# Patient Record
Sex: MALE | Race: WHITE | NOT HISPANIC OR LATINO | Employment: UNEMPLOYED | URBAN - NONMETROPOLITAN AREA
[De-identification: names, ages, dates, MRNs, and addresses within clinical notes are randomized per-mention and may not be internally consistent; named-entity substitution may affect disease eponyms.]

---

## 2017-08-10 ENCOUNTER — HOSPITAL ENCOUNTER (OUTPATIENT)
Facility: HOSPITAL | Age: 57
Setting detail: OBSERVATION
Discharge: ANOTHER HOSPITAL | End: 2017-08-12
Attending: INTERNAL MEDICINE | Admitting: INTERNAL MEDICINE

## 2017-08-10 ENCOUNTER — APPOINTMENT (OUTPATIENT)
Dept: GENERAL RADIOLOGY | Facility: HOSPITAL | Age: 57
End: 2017-08-10

## 2017-08-10 PROBLEM — I21.3 STEMI (ST ELEVATION MYOCARDIAL INFARCTION) (HCC): Status: ACTIVE | Noted: 2017-08-10

## 2017-08-10 LAB — ACT BLD: 114 SECONDS (ref 82–152)

## 2017-08-10 PROCEDURE — 25010000002 FENTANYL CITRATE (PF) 100 MCG/2ML SOLUTION: Performed by: INTERNAL MEDICINE

## 2017-08-10 PROCEDURE — C1769 GUIDE WIRE: HCPCS | Performed by: INTERNAL MEDICINE

## 2017-08-10 PROCEDURE — 85347 COAGULATION TIME ACTIVATED: CPT

## 2017-08-10 PROCEDURE — 25010000002 ONDANSETRON PER 1 MG: Performed by: INTERNAL MEDICINE

## 2017-08-10 PROCEDURE — 25010000002 MIDAZOLAM PER 1 MG: Performed by: INTERNAL MEDICINE

## 2017-08-10 PROCEDURE — G0379 DIRECT REFER HOSPITAL OBSERV: HCPCS

## 2017-08-10 PROCEDURE — 94799 UNLISTED PULMONARY SVC/PX: CPT

## 2017-08-10 PROCEDURE — 71010 XR CHEST 1 VW: CPT | Performed by: RADIOLOGY

## 2017-08-10 PROCEDURE — 0 IOPAMIDOL PER 1 ML: Performed by: INTERNAL MEDICINE

## 2017-08-10 PROCEDURE — 71010 HC CHEST PA OR AP: CPT

## 2017-08-10 PROCEDURE — G0378 HOSPITAL OBSERVATION PER HR: HCPCS

## 2017-08-10 PROCEDURE — C1894 INTRO/SHEATH, NON-LASER: HCPCS | Performed by: INTERNAL MEDICINE

## 2017-08-10 PROCEDURE — S0260 H&P FOR SURGERY: HCPCS | Performed by: INTERNAL MEDICINE

## 2017-08-10 PROCEDURE — 93454 CORONARY ARTERY ANGIO S&I: CPT | Performed by: INTERNAL MEDICINE

## 2017-08-10 PROCEDURE — 25010000002 DIPHENHYDRAMINE PER 50 MG: Performed by: INTERNAL MEDICINE

## 2017-08-10 PROCEDURE — 25010000002 HYDRALAZINE PER 20 MG: Performed by: INTERNAL MEDICINE

## 2017-08-10 RX ORDER — MIDAZOLAM HYDROCHLORIDE 1 MG/ML
INJECTION INTRAMUSCULAR; INTRAVENOUS AS NEEDED
Status: DISCONTINUED | OUTPATIENT
Start: 2017-08-10 | End: 2017-08-10 | Stop reason: HOSPADM

## 2017-08-10 RX ORDER — SODIUM CHLORIDE 9 MG/ML
INJECTION, SOLUTION INTRAVENOUS CONTINUOUS PRN
Status: DISCONTINUED | OUTPATIENT
Start: 2017-08-10 | End: 2017-08-10 | Stop reason: HOSPADM

## 2017-08-10 RX ORDER — ONDANSETRON 2 MG/ML
INJECTION INTRAMUSCULAR; INTRAVENOUS AS NEEDED
Status: DISCONTINUED | OUTPATIENT
Start: 2017-08-10 | End: 2017-08-10 | Stop reason: HOSPADM

## 2017-08-10 RX ORDER — ACETAMINOPHEN 325 MG/1
650 TABLET ORAL EVERY 4 HOURS PRN
Status: DISCONTINUED | OUTPATIENT
Start: 2017-08-10 | End: 2017-08-12 | Stop reason: HOSPADM

## 2017-08-10 RX ORDER — HYDRALAZINE HYDROCHLORIDE 20 MG/ML
INJECTION INTRAMUSCULAR; INTRAVENOUS AS NEEDED
Status: DISCONTINUED | OUTPATIENT
Start: 2017-08-10 | End: 2017-08-10 | Stop reason: HOSPADM

## 2017-08-10 RX ORDER — FENTANYL CITRATE 50 UG/ML
INJECTION, SOLUTION INTRAMUSCULAR; INTRAVENOUS AS NEEDED
Status: DISCONTINUED | OUTPATIENT
Start: 2017-08-10 | End: 2017-08-10 | Stop reason: HOSPADM

## 2017-08-10 RX ORDER — TRAMADOL HYDROCHLORIDE 50 MG/1
50 TABLET ORAL EVERY 8 HOURS PRN
COMMUNITY

## 2017-08-10 RX ORDER — LIDOCAINE HYDROCHLORIDE 20 MG/ML
INJECTION, SOLUTION INFILTRATION; PERINEURAL AS NEEDED
Status: DISCONTINUED | OUTPATIENT
Start: 2017-08-10 | End: 2017-08-10 | Stop reason: HOSPADM

## 2017-08-10 RX ORDER — CLOPIDOGREL BISULFATE 75 MG/1
TABLET ORAL AS NEEDED
Status: DISCONTINUED | OUTPATIENT
Start: 2017-08-10 | End: 2017-08-10 | Stop reason: HOSPADM

## 2017-08-10 RX ORDER — DIPHENHYDRAMINE HYDROCHLORIDE 50 MG/ML
INJECTION INTRAMUSCULAR; INTRAVENOUS AS NEEDED
Status: DISCONTINUED | OUTPATIENT
Start: 2017-08-10 | End: 2017-08-10 | Stop reason: HOSPADM

## 2017-08-10 RX ORDER — SODIUM CHLORIDE 0.9 % (FLUSH) 0.9 %
1-10 SYRINGE (ML) INJECTION AS NEEDED
Status: DISCONTINUED | OUTPATIENT
Start: 2017-08-10 | End: 2017-08-12 | Stop reason: HOSPADM

## 2017-08-10 RX ORDER — NICOTINE 21 MG/24HR
1 PATCH, TRANSDERMAL 24 HOURS TRANSDERMAL EVERY 24 HOURS
Status: DISCONTINUED | OUTPATIENT
Start: 2017-08-10 | End: 2017-08-12 | Stop reason: HOSPADM

## 2017-08-10 RX ORDER — SODIUM CHLORIDE 9 MG/ML
100 INJECTION, SOLUTION INTRAVENOUS CONTINUOUS
Status: DISCONTINUED | OUTPATIENT
Start: 2017-08-10 | End: 2017-08-12

## 2017-08-10 RX ADMIN — SODIUM CHLORIDE 100 ML/HR: 9 INJECTION, SOLUTION INTRAVENOUS at 17:01

## 2017-08-10 NOTE — PLAN OF CARE
Problem: Pain, Acute (Adult)  Goal: Identify Related Risk Factors and Signs and Symptoms  Outcome: Ongoing (interventions implemented as appropriate)    08/10/17 1608   Pain, Acute   Signs and Symptoms (Acute Pain) verbalization of pain descriptors;nausea/vomiting/anorexia       Goal: Acceptable Pain Control/Comfort Level  Outcome: Ongoing (interventions implemented as appropriate)    08/10/17 1608   Pain, Acute (Adult)   Acceptable Pain Control/Comfort Level making progress toward outcome

## 2017-08-11 LAB
ALBUMIN SERPL-MCNC: 3.7 G/DL (ref 3.5–5)
ALBUMIN/GLOB SERPL: 1.4 G/DL (ref 1.5–2.5)
ALP SERPL-CCNC: 81 U/L (ref 40–129)
ALT SERPL W P-5'-P-CCNC: 11 U/L (ref 10–44)
ANION GAP SERPL CALCULATED.3IONS-SCNC: 4.3 MMOL/L (ref 3.6–11.2)
AST SERPL-CCNC: 12 U/L (ref 10–34)
BILIRUB SERPL-MCNC: 0.4 MG/DL (ref 0.2–1.8)
BUN BLD-MCNC: 19 MG/DL (ref 7–21)
BUN/CREAT SERPL: 18.6 (ref 7–25)
CALCIUM SPEC-SCNC: 9.1 MG/DL (ref 7.7–10)
CHLORIDE SERPL-SCNC: 102 MMOL/L (ref 99–112)
CO2 SERPL-SCNC: 29.7 MMOL/L (ref 24.3–31.9)
CREAT BLD-MCNC: 1.02 MG/DL (ref 0.43–1.29)
DEPRECATED RDW RBC AUTO: 49.1 FL (ref 37–54)
ERYTHROCYTE [DISTWIDTH] IN BLOOD BY AUTOMATED COUNT: 15 % (ref 11.5–14.5)
GFR SERPL CREATININE-BSD FRML MDRD: 75 ML/MIN/1.73
GLOBULIN UR ELPH-MCNC: 2.6 GM/DL
GLUCOSE BLD-MCNC: 106 MG/DL (ref 70–110)
GLUCOSE BLDC GLUCOMTR-MCNC: 109 MG/DL (ref 70–130)
HCT VFR BLD AUTO: 39 % (ref 42–52)
HGB BLD-MCNC: 12.9 G/DL (ref 14–18)
MCH RBC QN AUTO: 30.9 PG (ref 27–33)
MCHC RBC AUTO-ENTMCNC: 33.1 G/DL (ref 33–37)
MCV RBC AUTO: 93.5 FL (ref 80–94)
OSMOLALITY SERPL CALC.SUM OF ELEC: 274.6 MOSM/KG (ref 273–305)
PLATELET # BLD AUTO: 371 10*3/MM3 (ref 130–400)
PMV BLD AUTO: 9.2 FL (ref 6–10)
POTASSIUM BLD-SCNC: 3.6 MMOL/L (ref 3.5–5.3)
PROT SERPL-MCNC: 6.3 G/DL (ref 6–8)
RBC # BLD AUTO: 4.17 10*6/MM3 (ref 4.7–6.1)
SODIUM BLD-SCNC: 136 MMOL/L (ref 135–153)
TROPONIN I SERPL-MCNC: 0.01 NG/ML
TROPONIN I SERPL-MCNC: 0.01 NG/ML
TROPONIN I SERPL-MCNC: 0.02 NG/ML
WBC NRBC COR # BLD: 12.95 10*3/MM3 (ref 4.5–12.5)

## 2017-08-11 PROCEDURE — 99213 OFFICE O/P EST LOW 20 MIN: CPT | Performed by: INTERNAL MEDICINE

## 2017-08-11 PROCEDURE — G0378 HOSPITAL OBSERVATION PER HR: HCPCS

## 2017-08-11 PROCEDURE — 82962 GLUCOSE BLOOD TEST: CPT

## 2017-08-11 PROCEDURE — 84484 ASSAY OF TROPONIN QUANT: CPT | Performed by: INTERNAL MEDICINE

## 2017-08-11 PROCEDURE — 94799 UNLISTED PULMONARY SVC/PX: CPT

## 2017-08-11 PROCEDURE — 93010 ELECTROCARDIOGRAM REPORT: CPT | Performed by: INTERNAL MEDICINE

## 2017-08-11 PROCEDURE — 85027 COMPLETE CBC AUTOMATED: CPT | Performed by: INTERNAL MEDICINE

## 2017-08-11 PROCEDURE — 93005 ELECTROCARDIOGRAM TRACING: CPT | Performed by: INTERNAL MEDICINE

## 2017-08-11 PROCEDURE — 80053 COMPREHEN METABOLIC PANEL: CPT | Performed by: INTERNAL MEDICINE

## 2017-08-11 RX ORDER — GABAPENTIN 600 MG/1
600 TABLET ORAL 3 TIMES DAILY PRN
COMMUNITY

## 2017-08-11 RX ORDER — TRIAMCINOLONE ACETONIDE 1 MG/G
CREAM TOPICAL DAILY PRN
Status: CANCELLED | OUTPATIENT
Start: 2017-08-11

## 2017-08-11 RX ORDER — PANTOPRAZOLE SODIUM 40 MG/1
40 TABLET, DELAYED RELEASE ORAL DAILY
COMMUNITY
End: 2017-08-12 | Stop reason: HOSPADM

## 2017-08-11 RX ORDER — GABAPENTIN 300 MG/1
600 CAPSULE ORAL 3 TIMES DAILY PRN
Status: CANCELLED | OUTPATIENT
Start: 2017-08-11

## 2017-08-11 RX ORDER — BETAMETHASONE DIPROPIONATE 0.5 MG/G
1 CREAM TOPICAL DAILY PRN
COMMUNITY

## 2017-08-11 RX ORDER — SODIUM CHLORIDE 9 MG/ML
INJECTION, SOLUTION INTRAVENOUS
Status: DISPENSED
Start: 2017-08-11 | End: 2017-08-11

## 2017-08-11 RX ORDER — TRAMADOL HYDROCHLORIDE 50 MG/1
50 TABLET ORAL EVERY 8 HOURS PRN
Status: CANCELLED | OUTPATIENT
Start: 2017-08-11

## 2017-08-11 RX ORDER — PANTOPRAZOLE SODIUM 40 MG/1
40 TABLET, DELAYED RELEASE ORAL
Status: DISCONTINUED | OUTPATIENT
Start: 2017-08-12 | End: 2017-08-12 | Stop reason: SDUPTHER

## 2017-08-11 RX ORDER — ONDANSETRON 4 MG/1
8 TABLET, FILM COATED ORAL EVERY 8 HOURS PRN
Status: DISCONTINUED | OUTPATIENT
Start: 2017-08-11 | End: 2017-08-12 | Stop reason: HOSPADM

## 2017-08-11 RX ORDER — PANTOPRAZOLE SODIUM 40 MG/1
40 TABLET, DELAYED RELEASE ORAL ONCE
Status: COMPLETED | OUTPATIENT
Start: 2017-08-12 | End: 2017-08-11

## 2017-08-11 RX ORDER — SODIUM CHLORIDE 9 MG/ML
INJECTION, SOLUTION INTRAVENOUS
Status: DISCONTINUED
Start: 2017-08-11 | End: 2017-08-11 | Stop reason: WASHOUT

## 2017-08-11 RX ORDER — NAPROXEN 250 MG/1
500 TABLET ORAL 2 TIMES DAILY WITH MEALS
Status: CANCELLED | OUTPATIENT
Start: 2017-08-11

## 2017-08-11 RX ADMIN — PANTOPRAZOLE SODIUM 40 MG: 40 TABLET, DELAYED RELEASE ORAL at 23:20

## 2017-08-11 RX ADMIN — ONDANSETRON 8 MG: 4 TABLET, FILM COATED ORAL at 23:19

## 2017-08-11 RX ADMIN — SODIUM CHLORIDE 1000 ML: 9 INJECTION, SOLUTION INTRAVENOUS at 09:57

## 2017-08-11 RX ADMIN — SODIUM CHLORIDE 1000 ML: 9 INJECTION, SOLUTION INTRAVENOUS at 23:18

## 2017-08-11 NOTE — DISCHARGE SUMMARY
Chief complaint:  Abdominal pain    History of present illness:  Mr. Castro is a pleasant 57-year-old gentleman who presented to [facility] with acute onset of abdominal discomfort.  He had an EKG obtained which was concerning for acute ST elevation in the inferolateral leads.  As such, he was transferred urgently to [facility].    Hospital course:  The patient underwent emergent cardiac catheterization which revealed normal coronary arteries.  He was observed overnight, did well and is being discharged to home.    Discharge diagnoses:  Probable viral gastroenteritis    Procedures performed:  Coronary angiography    Discharge medications:  See EMR for details    Discharge instructions:  The patient should follow-up with his primary care provider in approximately 2-4 weeks.  The patient should return to the emergency department when necessary recurrent symptoms

## 2017-08-11 NOTE — H&P
Chief complaint:  Abdominal discomfort and belly pain    History of present illness:  Mr. Castro is a pleasant 57-year-old gentleman who presents with a history of nausea and emesis.  He presented to [facility] and was noted to have ST elevation in the inferolateral leads.  Because of his presentation he was referred for emergent cardiac catheterization.  He states that he did have some mild low-grade temperatures over the last 2 days.  He felt as if he simply had a stomach virus.  He denied any angina or anginal-like symptoms.  He denies history of known coronary artery disease.    His 14 point review of systems is negative as was otherwise mentioned in history of present illness    His past medical history is notable for:  Hypertension    He is currently not taking any medicines    He has no known drug allergies.    Social history is notable for long history of tobacco consumption he does not consume excessive alcohol use illicit drugs.  Family history is unremarkable for premature coronary artery disease    Current physical examination:  Blood pressure is 113/67 with a heart rate of 76  In general he is a well-nourished well-developed gentleman in no apparent distress, alert and oriented ×3  HEENT exam reveals his oral mucosa to be normal.  He has no significant JVD or hepatojugular reflex.  He has no carotid bruits noted.  Chest is symmetric  Lungs are clear to auscultation with good air movement bilaterally.  His expiratory phase is normal  Cardiac exam reveals an intact PMI with a regular rate and rhythm.  He has a normal S1 and S2.  There is no S3 or S4.  There are no murmurs rubs or bruits.  Abdominal exam reveals a soft belly which is nontender with normal bowel sounds and no hepatosplenomegaly.  Extremities reveal no clubbing cyanosis or edema  Musculoskeletal exam is normal  Neurologic exam is normal    Current laboratory data:  Cardiac catheterization from earlier today was  unremarkable    Final impression and plan:  Overall it is my impression that Mr. Castro has presented with about an acute coronary syndrome but likely an acute GI process that is now improved.  I will continue to observe him overnight if his laboratory data is normal in the morning and I will discharge him to home.

## 2017-08-11 NOTE — NURSING NOTE
Patients heart cath was normal and Dr. Sparks's notes indicated his Discharge Dx is probable viral gastroenteritis   He does not qualify for Cardiac Rehab at this time.

## 2017-08-11 NOTE — PLAN OF CARE
Problem: Pain, Acute (Adult)  Goal: Identify Related Risk Factors and Signs and Symptoms  Outcome: Ongoing (interventions implemented as appropriate)  Goal: Acceptable Pain Control/Comfort Level  Outcome: Ongoing (interventions implemented as appropriate)    Problem: Patient Care Overview (Adult)  Goal: Plan of Care Review  Outcome: Ongoing (interventions implemented as appropriate)  Goal: Adult Individualization and Mutuality  Outcome: Ongoing (interventions implemented as appropriate)  Goal: Discharge Needs Assessment  Outcome: Ongoing (interventions implemented as appropriate)

## 2017-08-11 NOTE — PROGRESS NOTES
LOS: 0 days   Patient Care Team:  Jass Gay MD as PCP - General (Internal Medicine)      Subjective     Admission information:    Mr. Castro is a pleasant 57-year-old gentleman who presents with a history of nausea and emesis.  He presented to [facility] and was noted to have ST elevation in the inferolateral leads.  Because of his presentation he was referred for emergent cardiac catheterization.  He states that he did have some mild low-grade temperatures over the last 2 days.  He felt as if he simply had a stomach virus.  He denied any angina or anginal-like symptoms.  He denies history of known coronary artery disease.    Interval History:     The patient was discharged earlier to  by Dr. Sparks by was called to the room and she wouldn't have a shower and became dizzy.  He subsequently lost consciousness briefly.  They tried to stand up he almost lost consciousness again.  On evaluation his blood pressure was noted to be low at 80s over 50s when standing.  His discharge was therefore canceled and the patient was started on IV fluids.    History taken from: patient chart    Vital Signs  Temp:  [96.9 °F (36.1 °C)-99.1 °F (37.3 °C)] 98.3 °F (36.8 °C)  Heart Rate:  [59-96] 77  Resp:  [14-20] 18  BP: ()/(40-89) 106/62    Physical Exam:     Physical Exam   Constitutional: He is oriented to person, place, and time. He appears well-developed and well-nourished.   White male laying comfortably on bed.   HENT:   Mouth/Throat: Oropharynx is clear and moist.   Eyes: EOM are normal. Pupils are equal, round, and reactive to light.   Neck: Neck supple. No JVD present. No tracheal deviation present. No thyromegaly present.   Cardiovascular: Normal rate, regular rhythm, S1 normal and S2 normal.  Exam reveals no gallop and no friction rub.    No murmur heard.  Pulmonary/Chest: Effort normal and breath sounds normal. No respiratory distress. He has no wheezes. He has no rales.   Abdominal: Soft. Bowel sounds  are normal. He exhibits no mass. There is no tenderness.   Musculoskeletal: Normal range of motion. He exhibits no edema.   Lymphadenopathy:     He has no cervical adenopathy.   Neurological: He is alert and oriented to person, place, and time.   Skin: Skin is warm and dry. No rash noted.   Psychiatric: He has a normal mood and affect.       Results Review:     I reviewed the patient's new clinical results.  I reviewed the patient's new imaging results and agree with the interpretation.  I reviewed the patient's other test results and agree with the interpretation  I personally viewed and interpreted the patient's EKG/Telemetry data    Medication:  Scheduled Meds:  nicotine 1 patch Transdermal Q24H   sodium chloride        Continuous Infusions:  sodium chloride 100 mL/hr Last Rate: Stopped (08/10/17 2300)     PRN Meds:.•  acetaminophen  •  sodium chloride    Telemetry:Sinus rhythm in the 50s and 60s.      Assessment/Plan     1.  Abnormal EKG: Patient with abnormal EKG at [facility] which showed ST elevation in the inferolateral leads.  He underwent emergent cardiac catheterization which showed no evidence of coronary artery disease.    2.  Viral gastroenteritis: Patient with a history of a history of viral gastroenteritis with 3 days of nausea and vomiting.    3.  Syncope: Patient with what appears to be orthostatic syncope due to dehydration.    Plan:    1.  Syncope: This point we will cancel the patient is discharged and keep him overnight.  I have ordered 1 L of normal saline to rehydrate the patient.  Telemetry did not show any arrhythmias during the episode of syncope.  This point we'll continue to monitor.      Beltran Farr MD  08/11/17  10:35 AM    Dragon disclaimer:  Much of this encounter note is an electronic transcription/translation of spoken language to printed text. The electronic translation of spoken language may permit erroneous, or at times, nonsensical words or phrases  to be inadvertently transcribed; Although I have reviewed the note for such errors, some may still exist.

## 2017-08-12 ENCOUNTER — APPOINTMENT (OUTPATIENT)
Dept: CT IMAGING | Facility: HOSPITAL | Age: 57
End: 2017-08-12

## 2017-08-12 ENCOUNTER — HOSPITAL ENCOUNTER (INPATIENT)
Facility: HOSPITAL | Age: 57
LOS: 3 days | Discharge: HOME | End: 2017-08-15
Attending: INTERNAL MEDICINE | Admitting: INTERNAL MEDICINE

## 2017-08-12 VITALS
DIASTOLIC BLOOD PRESSURE: 69 MMHG | TEMPERATURE: 98.1 F | HEIGHT: 71 IN | RESPIRATION RATE: 20 BRPM | HEART RATE: 75 BPM | BODY MASS INDEX: 24.5 KG/M2 | OXYGEN SATURATION: 100 % | WEIGHT: 175 LBS | SYSTOLIC BLOOD PRESSURE: 119 MMHG

## 2017-08-12 DIAGNOSIS — D62 ACUTE BLOOD LOSS ANEMIA: Primary | ICD-10-CM

## 2017-08-12 PROBLEM — I10 HYPERTENSION: Status: ACTIVE | Noted: 2017-08-12

## 2017-08-12 PROBLEM — M19.90 ARTHRITIS: Status: ACTIVE | Noted: 2017-08-12

## 2017-08-12 PROBLEM — K92.2 GI BLEED: Status: ACTIVE | Noted: 2017-08-12

## 2017-08-12 PROBLEM — K52.9 GASTROENTERITIS: Status: ACTIVE | Noted: 2017-08-12

## 2017-08-12 LAB
ABO GROUP BLD: NORMAL
ABO GROUP BLD: NORMAL
ALBUMIN SERPL-MCNC: 3 G/DL (ref 3.5–5)
ALBUMIN SERPL-MCNC: 3.2 G/DL (ref 3.2–4.8)
ALBUMIN/GLOB SERPL: 1.4 G/DL (ref 1.5–2.5)
ALBUMIN/GLOB SERPL: 1.6 G/DL (ref 1.5–2.5)
ALP SERPL-CCNC: 55 U/L (ref 40–129)
ALP SERPL-CCNC: 61 U/L (ref 25–100)
ALT SERPL W P-5'-P-CCNC: 7 U/L (ref 10–44)
ALT SERPL W P-5'-P-CCNC: 7 U/L (ref 7–40)
ANION GAP SERPL CALCULATED.3IONS-SCNC: 2.5 MMOL/L (ref 3.6–11.2)
ANION GAP SERPL CALCULATED.3IONS-SCNC: 3 MMOL/L (ref 3–11)
APTT PPP: 24.5 SECONDS (ref 24–31)
APTT PPP: 25 SECONDS (ref 23.8–36.1)
AST SERPL-CCNC: 10 U/L (ref 10–34)
AST SERPL-CCNC: 7 U/L (ref 0–33)
BASOPHILS # BLD AUTO: 0.02 10*3/MM3 (ref 0–0.2)
BASOPHILS # BLD AUTO: 0.03 10*3/MM3 (ref 0–0.3)
BASOPHILS NFR BLD AUTO: 0.1 % (ref 0–1)
BASOPHILS NFR BLD AUTO: 0.2 % (ref 0–2)
BILIRUB SERPL-MCNC: 0.2 MG/DL (ref 0.2–1.8)
BILIRUB SERPL-MCNC: 0.3 MG/DL (ref 0.3–1.2)
BLD GP AB SCN SERPL QL: NEGATIVE
BLD GP AB SCN SERPL QL: NEGATIVE
BUN BLD-MCNC: 33 MG/DL (ref 9–23)
BUN BLD-MCNC: 51 MG/DL (ref 7–21)
BUN/CREAT SERPL: 47.1 (ref 7–25)
BUN/CREAT SERPL: 61.4 (ref 7–25)
CALCIUM SPEC-SCNC: 8 MG/DL (ref 8.7–10.4)
CALCIUM SPEC-SCNC: 8.1 MG/DL (ref 7.7–10)
CHLORIDE SERPL-SCNC: 111 MMOL/L (ref 99–109)
CHLORIDE SERPL-SCNC: 111 MMOL/L (ref 99–112)
CO2 SERPL-SCNC: 22.5 MMOL/L (ref 24.3–31.9)
CO2 SERPL-SCNC: 23 MMOL/L (ref 20–31)
CREAT BLD-MCNC: 0.7 MG/DL (ref 0.6–1.3)
CREAT BLD-MCNC: 0.83 MG/DL (ref 0.43–1.29)
D-LACTATE SERPL-SCNC: 1.5 MMOL/L (ref 0.5–2)
DEPRECATED RDW RBC AUTO: 47.4 FL (ref 37–54)
DEPRECATED RDW RBC AUTO: 50.1 FL (ref 37–54)
EOSINOPHIL # BLD AUTO: 0.18 10*3/MM3 (ref 0–0.7)
EOSINOPHIL # BLD AUTO: 0.19 10*3/MM3 (ref 0–0.3)
EOSINOPHIL NFR BLD AUTO: 1.1 % (ref 0–3)
EOSINOPHIL NFR BLD AUTO: 1.1 % (ref 0–5)
ERYTHROCYTE [DISTWIDTH] IN BLOOD BY AUTOMATED COUNT: 14.5 % (ref 11.5–14.5)
ERYTHROCYTE [DISTWIDTH] IN BLOOD BY AUTOMATED COUNT: 14.8 % (ref 11.3–14.5)
GFR SERPL CREATININE-BSD FRML MDRD: 115 ML/MIN/1.73
GFR SERPL CREATININE-BSD FRML MDRD: 95 ML/MIN/1.73
GLOBULIN UR ELPH-MCNC: 2 GM/DL
GLOBULIN UR ELPH-MCNC: 2.1 GM/DL
GLUCOSE BLD-MCNC: 132 MG/DL (ref 70–110)
GLUCOSE BLD-MCNC: 99 MG/DL (ref 70–100)
GLUCOSE BLDC GLUCOMTR-MCNC: 123 MG/DL (ref 70–130)
HBA1C MFR BLD: 5.6 % (ref 4.8–5.6)
HCT VFR BLD AUTO: 21.9 % (ref 42–52)
HCT VFR BLD AUTO: 23.1 % (ref 38.9–50.9)
HCT VFR BLD AUTO: 24.2 % (ref 42–52)
HCT VFR BLD AUTO: 26.9 % (ref 38.9–50.9)
HEMOCCULT STL QL: POSITIVE
HGB BLD-MCNC: 7.2 G/DL (ref 14–18)
HGB BLD-MCNC: 7.8 G/DL (ref 13.1–17.5)
HGB BLD-MCNC: 7.8 G/DL (ref 14–18)
HGB BLD-MCNC: 9 G/DL (ref 13.1–17.5)
IMM GRANULOCYTES # BLD: 0.1 10*3/MM3 (ref 0–0.03)
IMM GRANULOCYTES # BLD: 0.17 10*3/MM3 (ref 0–0.03)
IMM GRANULOCYTES NFR BLD: 0.6 % (ref 0–0.6)
IMM GRANULOCYTES NFR BLD: 1 % (ref 0–0.5)
INR PPP: 1
INR PPP: 1.1 (ref 0.9–1.1)
LIPASE SERPL-CCNC: 32 U/L (ref 6–51)
LYMPHOCYTES # BLD AUTO: 3.12 10*3/MM3 (ref 0.6–4.8)
LYMPHOCYTES # BLD AUTO: 3.77 10*3/MM3 (ref 1–3)
LYMPHOCYTES NFR BLD AUTO: 18.2 % (ref 24–44)
LYMPHOCYTES NFR BLD AUTO: 22.8 % (ref 21–51)
MAGNESIUM SERPL-MCNC: 1.8 MG/DL (ref 1.3–2.7)
MCH RBC QN AUTO: 30.8 PG (ref 27–33)
MCH RBC QN AUTO: 31.1 PG (ref 27–31)
MCHC RBC AUTO-ENTMCNC: 32.2 G/DL (ref 33–37)
MCHC RBC AUTO-ENTMCNC: 33.5 G/DL (ref 32–36)
MCV RBC AUTO: 93.1 FL (ref 80–99)
MCV RBC AUTO: 95.7 FL (ref 80–94)
MONOCYTES # BLD AUTO: 1.32 10*3/MM3 (ref 0.1–0.9)
MONOCYTES # BLD AUTO: 1.4 10*3/MM3 (ref 0–1)
MONOCYTES NFR BLD AUTO: 8 % (ref 0–10)
MONOCYTES NFR BLD AUTO: 8.2 % (ref 0–12)
NEUTROPHILS # BLD AUTO: 11.05 10*3/MM3 (ref 1.4–6.5)
NEUTROPHILS # BLD AUTO: 12.32 10*3/MM3 (ref 1.5–8.3)
NEUTROPHILS NFR BLD AUTO: 66.9 % (ref 30–70)
NEUTROPHILS NFR BLD AUTO: 71.8 % (ref 41–71)
OSMOLALITY SERPL CALC.SUM OF ELEC: 287.5 MOSM/KG (ref 273–305)
PHOSPHATE SERPL-MCNC: 2.1 MG/DL (ref 2.4–5.1)
PLATELET # BLD AUTO: 286 10*3/MM3 (ref 150–450)
PLATELET # BLD AUTO: 351 10*3/MM3 (ref 130–400)
PMV BLD AUTO: 8.8 FL (ref 6–12)
PMV BLD AUTO: 9.3 FL (ref 6–10)
POTASSIUM BLD-SCNC: 4 MMOL/L (ref 3.5–5.5)
POTASSIUM BLD-SCNC: 4.2 MMOL/L (ref 3.5–5.3)
PROT SERPL-MCNC: 5.1 G/DL (ref 6–8)
PROT SERPL-MCNC: 5.2 G/DL (ref 5.7–8.2)
PROTHROMBIN TIME: 10.9 SECONDS (ref 9.6–11.5)
PROTHROMBIN TIME: 14.3 SECONDS (ref 11–15.4)
RBC # BLD AUTO: 2.53 10*6/MM3 (ref 4.7–6.1)
RBC # BLD AUTO: 2.89 10*6/MM3 (ref 4.2–5.76)
RH BLD: POSITIVE
RH BLD: POSITIVE
SODIUM BLD-SCNC: 136 MMOL/L (ref 135–153)
SODIUM BLD-SCNC: 137 MMOL/L (ref 132–146)
TROPONIN I SERPL-MCNC: 0.02 NG/ML
TSH SERPL DL<=0.05 MIU/L-ACNC: 2.33 MIU/ML (ref 0.35–5.35)
WBC NRBC COR # BLD: 16.52 10*3/MM3 (ref 4.5–12.5)
WBC NRBC COR # BLD: 17.15 10*3/MM3 (ref 3.5–10.8)

## 2017-08-12 PROCEDURE — 96361 HYDRATE IV INFUSION ADD-ON: CPT

## 2017-08-12 PROCEDURE — 36430 TRANSFUSION BLD/BLD COMPNT: CPT

## 2017-08-12 PROCEDURE — 84443 ASSAY THYROID STIM HORMONE: CPT | Performed by: INTERNAL MEDICINE

## 2017-08-12 PROCEDURE — 85014 HEMATOCRIT: CPT | Performed by: INTERNAL MEDICINE

## 2017-08-12 PROCEDURE — 86920 COMPATIBILITY TEST SPIN: CPT

## 2017-08-12 PROCEDURE — 86850 RBC ANTIBODY SCREEN: CPT | Performed by: INTERNAL MEDICINE

## 2017-08-12 PROCEDURE — 86901 BLOOD TYPING SEROLOGIC RH(D): CPT | Performed by: INTERNAL MEDICINE

## 2017-08-12 PROCEDURE — 84100 ASSAY OF PHOSPHORUS: CPT | Performed by: INTERNAL MEDICINE

## 2017-08-12 PROCEDURE — 86900 BLOOD TYPING SEROLOGIC ABO: CPT

## 2017-08-12 PROCEDURE — 85025 COMPLETE CBC W/AUTO DIFF WBC: CPT | Performed by: INTERNAL MEDICINE

## 2017-08-12 PROCEDURE — 84484 ASSAY OF TROPONIN QUANT: CPT | Performed by: INTERNAL MEDICINE

## 2017-08-12 PROCEDURE — 96365 THER/PROPH/DIAG IV INF INIT: CPT

## 2017-08-12 PROCEDURE — 83036 HEMOGLOBIN GLYCOSYLATED A1C: CPT | Performed by: INTERNAL MEDICINE

## 2017-08-12 PROCEDURE — P9016 RBC LEUKOCYTES REDUCED: HCPCS

## 2017-08-12 PROCEDURE — 85018 HEMOGLOBIN: CPT | Performed by: INTERNAL MEDICINE

## 2017-08-12 PROCEDURE — 83690 ASSAY OF LIPASE: CPT | Performed by: INTERNAL MEDICINE

## 2017-08-12 PROCEDURE — G0378 HOSPITAL OBSERVATION PER HR: HCPCS

## 2017-08-12 PROCEDURE — 74178 CT ABD&PLV WO CNTR FLWD CNTR: CPT

## 2017-08-12 PROCEDURE — 80053 COMPREHEN METABOLIC PANEL: CPT | Performed by: INTERNAL MEDICINE

## 2017-08-12 PROCEDURE — 99213 OFFICE O/P EST LOW 20 MIN: CPT | Performed by: INTERNAL MEDICINE

## 2017-08-12 PROCEDURE — 82962 GLUCOSE BLOOD TEST: CPT

## 2017-08-12 PROCEDURE — 0 DIATRIZOATE MEGLUMINE & SODIUM PER 1 ML

## 2017-08-12 PROCEDURE — 85610 PROTHROMBIN TIME: CPT | Performed by: INTERNAL MEDICINE

## 2017-08-12 PROCEDURE — 83735 ASSAY OF MAGNESIUM: CPT | Performed by: INTERNAL MEDICINE

## 2017-08-12 PROCEDURE — 86900 BLOOD TYPING SEROLOGIC ABO: CPT | Performed by: INTERNAL MEDICINE

## 2017-08-12 PROCEDURE — 99291 CRITICAL CARE FIRST HOUR: CPT | Performed by: INTERNAL MEDICINE

## 2017-08-12 PROCEDURE — 99221 1ST HOSP IP/OBS SF/LOW 40: CPT | Performed by: INTERNAL MEDICINE

## 2017-08-12 PROCEDURE — 82272 OCCULT BLD FECES 1-3 TESTS: CPT | Performed by: INTERNAL MEDICINE

## 2017-08-12 PROCEDURE — 85730 THROMBOPLASTIN TIME PARTIAL: CPT | Performed by: INTERNAL MEDICINE

## 2017-08-12 PROCEDURE — 93010 ELECTROCARDIOGRAM REPORT: CPT | Performed by: INTERNAL MEDICINE

## 2017-08-12 PROCEDURE — 0 IOPAMIDOL PER 1 ML: Performed by: INTERNAL MEDICINE

## 2017-08-12 PROCEDURE — 83605 ASSAY OF LACTIC ACID: CPT | Performed by: INTERNAL MEDICINE

## 2017-08-12 RX ORDER — SODIUM CHLORIDE 0.9 % (FLUSH) 0.9 %
1-10 SYRINGE (ML) INJECTION AS NEEDED
Status: DISCONTINUED | OUTPATIENT
Start: 2017-08-12 | End: 2017-08-14

## 2017-08-12 RX ORDER — DEXTROSE AND SODIUM CHLORIDE 5; .45 G/100ML; G/100ML
75 INJECTION, SOLUTION INTRAVENOUS CONTINUOUS
Status: DISCONTINUED | OUTPATIENT
Start: 2017-08-12 | End: 2017-08-14

## 2017-08-12 RX ORDER — SODIUM CHLORIDE 9 MG/ML
250 INJECTION, SOLUTION INTRAVENOUS CONTINUOUS
Status: DISCONTINUED | OUTPATIENT
Start: 2017-08-12 | End: 2017-08-12 | Stop reason: HOSPADM

## 2017-08-12 RX ORDER — SODIUM CHLORIDE 0.9 % (FLUSH) 0.9 %
1-10 SYRINGE (ML) INJECTION AS NEEDED
Status: CANCELLED | OUTPATIENT
Start: 2017-08-12

## 2017-08-12 RX ORDER — TRIAMCINOLONE ACETONIDE 1 MG/G
CREAM TOPICAL DAILY PRN
Status: DISCONTINUED | OUTPATIENT
Start: 2017-08-12 | End: 2017-08-15 | Stop reason: HOSPADM

## 2017-08-12 RX ORDER — ONDANSETRON 2 MG/ML
4 INJECTION INTRAMUSCULAR; INTRAVENOUS EVERY 6 HOURS PRN
Status: DISCONTINUED | OUTPATIENT
Start: 2017-08-12 | End: 2017-08-15 | Stop reason: HOSPADM

## 2017-08-12 RX ORDER — SODIUM CHLORIDE 9 MG/ML
250 INJECTION, SOLUTION INTRAVENOUS CONTINUOUS
Status: CANCELLED | OUTPATIENT
Start: 2017-08-12 | End: 2017-08-14

## 2017-08-12 RX ORDER — SODIUM CHLORIDE 9 MG/ML
250 INJECTION, SOLUTION INTRAVENOUS CONTINUOUS
Start: 2017-08-12 | End: 2017-08-15 | Stop reason: HOSPADM

## 2017-08-12 RX ORDER — ONDANSETRON HYDROCHLORIDE 8 MG/1
8 TABLET, FILM COATED ORAL EVERY 8 HOURS PRN
Refills: 0
Start: 2017-08-12

## 2017-08-12 RX ORDER — ONDANSETRON 4 MG/1
8 TABLET, FILM COATED ORAL EVERY 8 HOURS PRN
Status: CANCELLED | OUTPATIENT
Start: 2017-08-12

## 2017-08-12 RX ORDER — ACETAMINOPHEN 325 MG/1
650 TABLET ORAL EVERY 4 HOURS PRN
Status: CANCELLED | OUTPATIENT
Start: 2017-08-12

## 2017-08-12 RX ORDER — GABAPENTIN 300 MG/1
600 CAPSULE ORAL EVERY 8 HOURS SCHEDULED
Status: DISCONTINUED | OUTPATIENT
Start: 2017-08-12 | End: 2017-08-15 | Stop reason: HOSPADM

## 2017-08-12 RX ORDER — NICOTINE 21 MG/24HR
1 PATCH, TRANSDERMAL 24 HOURS TRANSDERMAL EVERY 24 HOURS
Status: CANCELLED | OUTPATIENT
Start: 2017-08-12

## 2017-08-12 RX ADMIN — SODIUM CHLORIDE 250 ML/HR: 9 INJECTION, SOLUTION INTRAVENOUS at 10:44

## 2017-08-12 RX ADMIN — PANTOPRAZOLE SODIUM 40 MG: 40 TABLET, DELAYED RELEASE ORAL at 05:39

## 2017-08-12 RX ADMIN — GABAPENTIN 600 MG: 300 CAPSULE ORAL at 16:38

## 2017-08-12 RX ADMIN — SODIUM CHLORIDE 8 MG/HR: 900 INJECTION INTRAVENOUS at 15:01

## 2017-08-12 RX ADMIN — Medication: at 16:38

## 2017-08-12 RX ADMIN — SODIUM CHLORIDE 1000 ML: 9 INJECTION, SOLUTION INTRAVENOUS at 06:59

## 2017-08-12 RX ADMIN — SODIUM CHLORIDE 8 MG/HR: 900 INJECTION INTRAVENOUS at 20:34

## 2017-08-12 RX ADMIN — DEXTROSE AND SODIUM CHLORIDE 75 ML/HR: 5; 450 INJECTION, SOLUTION INTRAVENOUS at 16:38

## 2017-08-12 RX ADMIN — PANTOPRAZOLE SODIUM 8 MG/HR: 40 INJECTION, POWDER, FOR SOLUTION INTRAVENOUS at 10:46

## 2017-08-12 RX ADMIN — IOPAMIDOL 85 ML: 755 INJECTION, SOLUTION INTRAVENOUS at 18:00

## 2017-08-12 NOTE — PLAN OF CARE
Problem: Fall Risk (Adult)  Goal: Absence of Falls  Outcome: Ongoing (interventions implemented as appropriate)    08/11/17 4864   Fall Risk (Adult)   Absence of Falls making progress toward outcome

## 2017-08-12 NOTE — PLAN OF CARE
Problem: Cardiac Output, Decreased (Adult)  Goal: Adequate Cardiac Output/Effective Tissue Perfusion  Outcome: Ongoing (interventions implemented as appropriate)    08/11/17 4433   Cardiac Output, Decreased (Adult)   Adequate Cardiac Output/Effective Tissue Perfusion making progress toward outcome

## 2017-08-12 NOTE — NURSING NOTE
"cna assisted pt up to bedside commode where the cna handed the patient the callbell and educated patient to call out for assistance when done.  Pt states before he was able to use the call light he became \"dizzy\" and felt \"tingly\".  The cna was still in the A-side part of the room and was able to promptly assist the patient. The cna assisted patient back to bed while calling for the nurse.  Patient stated he was not hurt. Upon assessment of patient no s/s of distress or injury noted.    "

## 2017-08-12 NOTE — CONSULTS
[facility] Gastroenterology Consult    Referring Provider: Levi Ward MD    PCP: Jass Gay MD    Reason for Consultation: Anemia. Blood in NGT    Chief complaint: Epigastric pain, nausea and vomiting, chest pain    History of present illness:    Rex Castro is a 57 y.o. male who is transferred to [facility] for symptomatic anemia.  3 days ago, the patient states that he developed epigastric pain, chest pain, nausea, and vomiting.  He thought that he may have had a virus.  He was evaluated at an outside hospital, where he had EKG changes suggestive of myocardial infarction.  He underwent cardiac catheterization yesterday, which was unremarkable.  Overnight, he had hypotension with systolic blood pressure in the  range.  He was planning to be discharged home today, but developed syncope while going to the bathroom.  NG tube was placed, with recovery of apparent coffee grounds.  He had drop in hemoglobin from 12.9 yesterday, to 7.2 this morning.  He was transfused 1 unit of packed red blood cells prior to transfer to [place].  Repeat hemoglobin upon arrival is 9.0.  He currently denies epigastric pain, or nausea.  NG tube has minimal output of gastric contents, which does not appear bloody.  Patient has had no bowel movements since yesterday.  He denies recent melena or hematochezia.  He does occasionally take NSAIDs for headache.    Allergies:  Review of patient's allergies indicates no known allergies.    Scheduled Meds:        Infusions:    pantoprazole 8 mg/hr Last Rate: 8 mg/hr (08/12/17 1501)       PRN Meds:  sodium chloride    Home Meds:  Prescriptions Prior to Admission   Medication Sig Dispense Refill Last Dose   • betamethasone dipropionate (DIPROLENE) 0.05 % cream Apply 1 application topically Daily As Needed for Rash. Applies to elbows and legs   Past Week at Unknown time   • gabapentin (NEURONTIN) 600 MG tablet Take 600 mg by mouth 3 (Three) Times a Day As Needed (nerve pain).   Past  Week at Unknown time   • methotrexate 2.5 MG tablet Take 10 mg by mouth 1 (One) Time Per Week.   Past Week at Unknown time   • ondansetron (ZOFRAN) 8 MG tablet Take 1 tablet by mouth Every 8 (Eight) Hours As Needed for Nausea or Vomiting.  0    • pantoprazole (PROTONIX) 40 mg/100 mL (0.4 mg/mL) in 0.9% NS IVPB Infuse 8 mg/hr into a venous catheter Continuous. Indications: Gastrointestinal (GI) Bleed      • sodium chloride 0.9 % solution Infuse 250 mL/hr into a venous catheter Continuous for 2 days.      • traMADol (ULTRAM) 50 MG tablet Take 50 mg by mouth Every 8 (Eight) Hours As Needed.   8/10/2017 at 0700       ROS: Review of Systems   Constitutional: Negative for activity change, appetite change and unexpected weight change.   HENT: Negative for nosebleeds and trouble swallowing.    Eyes: Negative.    Respiratory: Negative for chest tightness, shortness of breath and wheezing.    Cardiovascular: Positive for chest pain. Negative for palpitations.   Gastrointestinal: Positive for nausea and vomiting. Negative for abdominal distention, abdominal pain, blood in stool, constipation and diarrhea.   Endocrine: Negative.    Genitourinary: Negative for dysuria and hematuria.   Musculoskeletal: Negative.    Skin: Negative for rash.   Neurological: Positive for syncope and light-headedness.   Hematological: Negative for adenopathy. Does not bruise/bleed easily.   Psychiatric/Behavioral: Negative for agitation and behavioral problems. The patient is not nervous/anxious.        PAST MED HX:  Past Medical History:   Diagnosis Date   • GERD (gastroesophageal reflux disease)    • Hemorrhoids    • Hypertension    • Inguinal hernia    • Left Inguinal hernia    • Psoriasis        PAST SURG HX:  Past Surgical History:   Procedure Laterality Date   • CARDIAC CATHETERIZATION N/A 8/10/2017    Procedure: Left Heart Cath;  Surgeon: Adonis Sparks MD;  Location: [facility] CATH INVASIVE LOCATION;  Service:        [facility] HX:  Family History  "  Problem Relation Age of Onset   • Cancer Mother    • Heart attack Mother    • Prostate cancer Father    • GI problems Brother    • Rectal cancer Other (nephew)        SOC HX:  Social History     Social History   • Marital status:      Spouse name: N/A   • Number of children: N/A   • Years of education: N/A     Occupational History   • Not on file.     Social History Main Topics   • Smoking status: Current Some Day Smoker     Types: Cigars     Start date: 8/10/2016   • Smokeless tobacco: Not on file      Comment: smokes cigar every once in awhile   • Alcohol use No   • Drug use: No      Comment: Use to smoke marijuana x 40yrs   • Sexual activity: Yes     Partners: Female     Other Topics Concern   • Not on file     Social History Narrative       PHYSICAL EXAM  /71  Pulse 80  Temp 98.8 °F (37.1 °C) (Oral)   Resp 20  Ht 71\" (180.3 cm)  Wt 186 lb 8.2 oz (84.6 kg)  SpO2 99%  BMI 26.01 kg/m2  Wt Readings from Last 3 Encounters:   08/12/17 186 lb 8.2 oz (84.6 kg)   08/12/17 175 lb (79.4 kg)   ,body mass index is 26.01 kg/(m^2).  Physical Exam   Constitutional: He is oriented to person, place, and time. He appears well-developed and well-nourished. No distress.   HENT:   Head: Normocephalic.   Mouth/Throat: No oropharyngeal exudate.   Eyes: No scleral icterus.   Neck: Normal range of motion.   Cardiovascular: Normal rate and regular rhythm.    No murmur heard.  Pulmonary/Chest: Effort normal and breath sounds normal. He has no wheezes.   Abdominal: Soft. Bowel sounds are normal. He exhibits no distension and no mass. There is no tenderness.   Musculoskeletal: Normal range of motion. He exhibits no edema.   Neurological: He is alert and oriented to person, place, and time.   Skin: Skin is warm and dry.   + Ecchymosis right lower flank   Psychiatric: He has a normal mood and affect. His behavior is normal.   Nursing note and vitals reviewed.        Results Review:   I reviewed the patient's new clinical " results.    Lab Results   Component Value Date    WBC 16.52 (H) 08/12/2017    HGB 7.2 (L) 08/12/2017    HGB 7.8 (L) 08/12/2017    HGB 12.9 (L) 08/11/2017    HCT 21.9 (C) 08/12/2017    MCV 95.7 (H) 08/12/2017     08/12/2017       Lab Results   Component Value Date    INR 1.10 08/12/2017       Lab Results   Component Value Date    GLUCOSE 132 (H) 08/12/2017    BUN 51 (H) 08/12/2017    CREATININE 0.83 08/12/2017    EGFRIFNONA 95 08/12/2017    BCR 61.4 (H) 08/12/2017    CO2 22.5 (L) 08/12/2017    CALCIUM 8.1 08/12/2017    ALBUMIN 3.00 (L) 08/12/2017    AST 10 08/12/2017    ALT 7 (L) 08/12/2017         ASSESSMENTS/PLANS    Acute blood loss anemia  Epigastric pain, resolved.  Nausea and vomiting, resolved.    Discussion: The patient has an estimated 5-unit bleed and symptomatic acute blood loss anemia with syncope.  Elevated BUN would suggest upper GI source.  He is at risk for peptic ulcer disease and Irene-Spencer tear. The NG tube aspirate at outside hospital perhaps may have contained coffee grounds, but certainly was not thaddeus blood.  It would be atypical for a 5-unit bleed in the gut to not produce melena.  The patient's ecchymosis in the right flank above the right iliac crest could represent Grey Taylor sign, or a bruise from his fall this morning.    >> Recommend CT abdomen and pelvis to rule out retroperitoneal bleed.  >> Agree with Protonix  >> EGD tomorrow, if CT negative.    I discussed the patients findings and my recommendations with patient and family    Mark I. Brunner, MD  08/12/17  3:09 PM

## 2017-08-12 NOTE — NURSING NOTE
Notified pt's daughter jason greenfield of her father becoming dizzy with low blood pressure and pt's low hgb.explained that he would be transferred to [facility]

## 2017-08-12 NOTE — PLAN OF CARE
Problem: Fall Risk (Adult)  Goal: Identify Related Risk Factors and Signs and Symptoms  Outcome: Ongoing (interventions implemented as appropriate)    08/12/17 0805   Fall Risk   Fall Risk: Related Risk Factors (pt. has orthostatic hyptension. )   Fall Risk: Signs and Symptoms presence of risk factors         08/12/17 0805   Fall Risk   Fall Risk: Related Risk Factors (pt. has orthostatic hyptension. )   Fall Risk: Signs and Symptoms presence of risk factors

## 2017-08-12 NOTE — NURSING NOTE
ACC REVIEW REPORT: [facility]        PATIENT NAME: Rex Castro    PATIENT ID: [id number]    BED: N 219    BED TYPE: ICU    BED GIVEN TO: Stefani Jim    TIME BED GIVEN: 11:00    YOB: 1960    AGE: 57    GENDER: M    PREVIOUS ADMIT TO [facility]: no    PREVIOUS ADMISSION DATE:     PATIENT CLASS:     TODAY'S DATE: 8/12/2017    TRANSFER DATE: 8-12-17    ETA: after 12:15    TRANSFERRING FACILITY: [facility] [place]    TRANSFERRING FACILITY PHONE # : [phone]    TRANSFERRING MD: Dawson    DATE/TIME REQUEST RECEIVED: 8-12-17@[address] [place] [address] RN: Lorena Carrera    REPORT FROM: Stefani Fernandez    TIME REPORT TAKEN: 11:00    DIAGNOSIS: GI bleed    REASON FOR TRANSFER TO [facility]: higher level of care    TRANSPORTATION: flying    CLINICAL REASON FOR TRANSFER TO [facility]: 57 y.o. Presented to [facility] with possible STEMI - transferred to [facility] [place] 8/11/17 - heart cath was done- no dz; pt was to be discharged but then had syncope - unknown if LOC - found to have GI bleed      CLINICAL INFORMATION    HEIGHT:     WEIGHT:     ALLERGIES: NKA    HENNESSY: no    INFECTIOUS DISEASE: no    ISOLATION: no    LAST VITAL SIGNS:  TIME: 10:48  TEMP: 97.7  PULSE: 80  B/P: 114/68  RESP: 10-16    LAB INFORMATION: glucose 132, bun 51, Cr 0.83, total protein 5.1, albumin 3, WBC 16    CULTURE INFORMATION: H&H 8/12 @0104: 12/39                                                  H&H 8/12 @0537: 7.8/24                                                  H&H 8/12 @0818: 7.2/21  MEDS/IV FLUIDS:    #18 rt. Ac - saline lock  #18 left ac - PRBC - first unit infusing at time of report  #20 left forearm - protonix gtt @ 8mcg & NS @ 250cc/hr                     CARDIAC SYSTEM:    CHEST PAIN: no    RATE: 80    RHYTHM: NSR    Is patient taking or has patient been given any drugs that could increase bleeding? no  (Plavix, Brilinta, Effient, Eliquis, Xarelto, Warfarin, Integrilin, Angiomax)    DRUG:      DOSE/FREQUENCY:     CARDIAC ENZYMES:    DATE: 8/11  TIME: 10:31  TROP:  0.007    DATE: 8/11  TIME: 1553  TROP: 0.022    Troponin 8/11 @ 2115: 0.014 & today at 0411: 0.024      HEART CATH: yes    HEART CATH DATE: 8/11    HEART CATH RESULTS: no dz    CARDIAC NOTES:  No c/o dizziness or CP - pt has been on bedrest      OXYGEN: no    O2 SAT: 100% on RA      CNS/MUSCULOSKELETAL    ALERT AND ORIENTED: yes      GI//GY      ABDOMINAL PAIN: + tenderness    VOMITING: no    DIARRHEA: no    NAUSEA: yes when NGT was placed    BOWEL SOUNDS: positive    OCCULT STOOL: ?    NG TUBE: left nare at 62cm --->LWS - <50cc brownish/red bloody secretions    DATE INSERTED: 8/12    GI//GY NOTES: pt has been NPO since last evening; he is using a urinal  Originally felt to have viral gastroenteritis    PAST MEDICAL HISTORY: inguinal hernia        Yumiko Carrera, RN  8/12/2017  11:07 AM

## 2017-08-12 NOTE — NURSING NOTE
Dr. longo called back with orders to transfer pt. To [facility].  Report called to [facility] to david saldaña rn  Pt. Transferred to [facility]

## 2017-08-12 NOTE — DISCHARGE SUMMARY
Date of Discharge:  8/12/2017    Discharge Diagnosis: Acute GI bleed    Presenting Problem/History of Present Illness  STEMI (ST elevation myocardial infarction) [I21.3]     Mr. Castro is a pleasant 57-year-old gentleman who presented to [facility] with acute onset of abdominal discomfort.  He had an EKG obtained which was concerning for acute ST elevation in the inferolateral leads.  As such, he was transferred urgently to [facility].      Hospital Course  The patient was admitted to the cardiology service also possible ST elevation myocardial infarctions.  He underwent cardiac catheterization emergently which showed no evidence of coronary artery disease.  He was to be discharged on the next day when he presented with an episode of syncope when going to the bathroom.  The patient had been complaining of a viral gastroenteritis prior to being admitted so this was believed to be related to dehydration.  He was started on volume replacement with normal saline.  Overnight his blood pressure remained low.  On repeat evaluation his hematocrit was noted to drop from 36 to 24.  A confirmatory test showed his hematocrit had dropped further to 21.  An NG tube was placed and bloody discharge was obtained from his stomach.  The patient was started on a PPI infusion and 2 large-bore IVs were placed.  He was continued on fluid resuscitation since his blood pressure remained in the 80s over 40s.  He was kept nothing by mouth.  He was transferred to the CCU.  Due to the lack of GI coverage [facility] was consulted and the patient is to be transferred for further management there.    Procedures Performed  Procedure(s):  Left Heart Cath       Consults:   Consults     No orders found from 7/12/2017 to 8/11/2017.          Condition on Discharge:  Guarded    Vital Signs  Temp:  [97.3 °F (36.3 °C)-99 °F (37.2 °C)] (P) 98.4 °F (36.9 °C)  Heart Rate:  [] 85  Resp:  [14-20] (P) 17  BP: ()/(49-79)  (P) 118/79    Physical Exam:    Constitutional: He is oriented to person, place, and time. He appears well-developed and well-nourished.   White male laying comfortably on bed.   HENT:   Mouth/Throat: Oropharynx is clear and moist.   Eyes: EOM are normal. Pupils are equal, round, and reactive to light.   Neck: Neck supple. No JVD present. No tracheal deviation present. No thyromegaly present.   Cardiovascular: Normal rate, regular rhythm, S1 normal and S2 normal.  Exam reveals no gallop and no friction rub.    No murmur heard.  Pulmonary/Chest: Effort normal and breath sounds normal. No respiratory distress. He has no wheezes. He has no rales.   Abdominal: Soft. Bowel sounds are normal. He exhibits no mass. Mild tenderness to palpation noted.  Musculoskeletal: Normal range of motion. He exhibits no edema.   Lymphadenopathy:     He has no cervical adenopathy.   Neurological: He is alert and oriented to person, place, and time.   Skin: Skin is warm and dry. No rash noted.   Psychiatric: He has a normal mood and affect.     Discharge Disposition  Short Term Hospital ([facility])    Discharge Medications   Rex Castro   Home Medication Instructions NITO:[id number]    Printed on:08/12/17 1015   Medication Information                      betamethasone dipropionate (DIPROLENE) 0.05 % cream  Apply 1 application topically Daily As Needed for Rash. Applies to elbows and legs             diclofenac sodium (VOTAREN XR) 100 MG 24 hr tablet  Take 100 mg by mouth Daily.             gabapentin (NEURONTIN) 600 MG tablet  Take 600 mg by mouth 3 (Three) Times a Day As Needed (nerve pain).             methotrexate 2.5 MG tablet  Take 10 mg by mouth 1 (One) Time Per Week.             pantoprazole (PROTONIX) 40 MG EC tablet  Take 40 mg by mouth Daily.             traMADol (ULTRAM) 50 MG tablet  Take 50 mg by mouth Every 8 (Eight) Hours As Needed.                 Discharge Diet:  Nothing by mouth    Activity at Discharge:  Bed rest    Follow-up Appointments  No future appointments.      Test Results Pending at Discharge   Order Current Status    Protime-INR Collected (08/12/17 0958)    Troponin Collected (08/12/17 0959)    aPTT Collected (08/12/17 0958)           Beltran Farr MD  08/12/17  10:17 AM    Time: Discharge 45 min

## 2017-08-12 NOTE — NURSING NOTE
Dr. longo notified of pt. Fall and that there was no injury. Reported hgb..7.8 and hct. 24.2 , blood pressure  86/56 hr in the 80's sr , wbc 16.52. Orders noted.dr. longo notified no gi coverage available. Order noted for ct abd. And pelvis.

## 2017-08-12 NOTE — PROGRESS NOTES
LOS: 0 days   Patient Care Team:  Jass Gay MD as PCP - General (Internal Medicine)      Subjective     Admission information:    Mr. Castro is a pleasant 57-year-old gentleman who presents with a history of nausea and emesis.  He presented to [facility] and was noted to have ST elevation in the inferolateral leads.  Because of his presentation he was referred for emergent cardiac catheterization.  He states that he did have some mild low-grade temperatures over the last 2 days.  He felt as if he simply had a stomach virus.  He denied any angina or anginal-like symptoms.  He denies history of known coronary artery disease.    Interval History:     The patient was monitored overnight.  His blood pressure remained low so he was given multiple boluses of normal saline.  On evaluation this morning it was noted that his hematocrit had dropped from 39 to 24.  A repeat hematocrit was noted to be 21.  His BUNs noted to be elevated at 51.  An NG tube was placed and bloody stomach contents was obtained.  This point appears the patient is having active GI bleed.  He is now on a pantoprazole infusion.    History taken from: patient chart    Vital Signs  Temp:  [97.3 °F (36.3 °C)-99 °F (37.2 °C)] 97.9 °F (36.6 °C)  Heart Rate:  [] 78  Resp:  [14-20] 14  BP: ()/(49-75) 123/65    Physical Exam:     Physical Exam   Constitutional: He is oriented to person, place, and time. He appears well-developed and well-nourished.   White male laying comfortably on bed.   HENT:   Mouth/Throat: Oropharynx is clear and moist.   Eyes: EOM are normal. Pupils are equal, round, and reactive to light.   Neck: Neck supple. No JVD present. No tracheal deviation present. No thyromegaly present.   Cardiovascular: Normal rate, regular rhythm, S1 normal and S2 normal.  Exam reveals no gallop and no friction rub.    No murmur heard.  Pulmonary/Chest: Effort normal and breath sounds normal. No respiratory distress. He has no  wheezes. He has no rales.   Abdominal: Soft. Bowel sounds are normal. He exhibits no mass. There is no tenderness.   Musculoskeletal: Normal range of motion. He exhibits no edema.   Lymphadenopathy:     He has no cervical adenopathy.   Neurological: He is alert and oriented to person, place, and time.   Skin: Skin is warm and dry. No rash noted.   Psychiatric: He has a normal mood and affect.       Results Review:     I reviewed the patient's new clinical results.  I reviewed the patient's new imaging results and agree with the interpretation.  I reviewed the patient's other test results and agree with the interpretation  I personally viewed and interpreted the patient's EKG/Telemetry data    Medication:  Scheduled Meds:    nicotine 1 patch Transdermal Q24H     Continuous Infusions:    pantoprazole 8 mg/hr     PRN Meds:.•  acetaminophen  •  ondansetron  •  sodium chloride    Telemetry:Sinus rhythm in the 50s and 60s.      Assessment/Plan     1.  GI bleed: Patient appears to be an active GI bleed.  He had been complaining of a possible viral gastroenterology prior to admission with nausea and vomiting for the last 5 days.  He denied any melena hematochezia or hematemesis.    2.  Abnormal EKG: Patient with abnormal EKG at [facility] which showed ST elevation in the inferolateral leads.  He underwent emergent cardiac catheterization which showed no evidence of coronary artery disease.  After further review it appears this was more related to repolarization abnormality than actual ST elevation.    3.  Viral gastroenteritis: Patient with a history of a history of viral gastroenteritis with 3 days of nausea and vomiting.    4.  Syncope: Patient with what appears to be orthostatic syncope due to dehydration.    Plan:    1.  GI bleed:  He is on a pantoprazole infusion and has an NG tube in place.  He has been made nothing by mouth.  We do not have any coverage for gastroenterology at this hospital during this  weekend.  Will therefore need to transfer him to [facility] for further management.    2.  Syncope: At this point appears the patient's syncope was related to anemia.  At this point will continue hydration.    Beltran Farr MD  08/12/17  9:30 AM    Dragon disclaimer:  Much of this encounter note is an electronic transcription/translation of spoken language to printed text. The electronic translation of spoken language may permit erroneous, or at times, nonsensical words or phrases to be inadvertently transcribed; Although I have reviewed the note for such errors, some may still exist.

## 2017-08-12 NOTE — PLAN OF CARE
Problem: Patient Care Overview (Adult)  Goal: Plan of Care Review    08/12/17 1802   Coping/Psychosocial Response Interventions   Plan Of Care Reviewed With patient   Patient Care Overview   Progress improving   Outcome Evaluation   Outcome Summary/Follow up Plan pt admitted from [place] today in no acute distress. His only discomfort is his NG tube. His vitals have been stable. Pt taken to Ct- awaiting report. He had one BM that was hard and formed. Positive hemocult, however, pt was noted to have a hemorhoid slightly bleeding on the stool. protonix gtt and ivf continue.

## 2017-08-13 ENCOUNTER — ANESTHESIA EVENT (OUTPATIENT)
Dept: GASTROENTEROLOGY | Facility: HOSPITAL | Age: 57
End: 2017-08-13

## 2017-08-13 ENCOUNTER — ANESTHESIA (OUTPATIENT)
Dept: GASTROENTEROLOGY | Facility: HOSPITAL | Age: 57
End: 2017-08-13

## 2017-08-13 PROBLEM — D62 ACUTE BLOOD LOSS ANEMIA: Status: ACTIVE | Noted: 2017-08-12

## 2017-08-13 LAB
ABO + RH BLD: NORMAL
ALBUMIN SERPL-MCNC: 2.9 G/DL (ref 3.2–4.8)
ALBUMIN/GLOB SERPL: 1.5 G/DL (ref 1.5–2.5)
ALP SERPL-CCNC: 56 U/L (ref 25–100)
ALT SERPL W P-5'-P-CCNC: 11 U/L (ref 7–40)
ANION GAP SERPL CALCULATED.3IONS-SCNC: 5 MMOL/L (ref 3–11)
APTT PPP: 28.9 SECONDS (ref 24–31)
AST SERPL-CCNC: 11 U/L (ref 0–33)
BASOPHILS # BLD AUTO: 0.03 10*3/MM3 (ref 0–0.2)
BASOPHILS NFR BLD AUTO: 0.2 % (ref 0–1)
BH BB BLOOD EXPIRATION DATE: NORMAL
BH BB BLOOD TYPE BARCODE: 5100
BH BB DISPENSE STATUS: NORMAL
BH BB PRODUCT CODE: NORMAL
BH BB UNIT NUMBER: NORMAL
BILIRUB SERPL-MCNC: 0.3 MG/DL (ref 0.3–1.2)
BUN BLD-MCNC: 22 MG/DL (ref 9–23)
BUN/CREAT SERPL: 27.5 (ref 7–25)
CALCIUM SPEC-SCNC: 8.1 MG/DL (ref 8.7–10.4)
CHLORIDE SERPL-SCNC: 108 MMOL/L (ref 99–109)
CO2 SERPL-SCNC: 24 MMOL/L (ref 20–31)
CREAT BLD-MCNC: 0.8 MG/DL (ref 0.6–1.3)
CROSSMATCH INTERPRETATION: NORMAL
DEPRECATED RDW RBC AUTO: 50.8 FL (ref 37–54)
EOSINOPHIL # BLD AUTO: 0.22 10*3/MM3 (ref 0–0.3)
EOSINOPHIL NFR BLD AUTO: 1.4 % (ref 0–3)
ERYTHROCYTE [DISTWIDTH] IN BLOOD BY AUTOMATED COUNT: 15.2 % (ref 11.3–14.5)
GFR SERPL CREATININE-BSD FRML MDRD: 99 ML/MIN/1.73
GLOBULIN UR ELPH-MCNC: 2 GM/DL
GLUCOSE BLD-MCNC: 108 MG/DL (ref 70–100)
HCT VFR BLD AUTO: 22 % (ref 38.9–50.9)
HCT VFR BLD AUTO: 26.3 % (ref 38.9–50.9)
HCT VFR BLD AUTO: 27.9 % (ref 38.9–50.9)
HGB BLD-MCNC: 7.5 G/DL (ref 13.1–17.5)
HGB BLD-MCNC: 9.1 G/DL (ref 13.1–17.5)
HGB BLD-MCNC: 9.6 G/DL (ref 13.1–17.5)
IMM GRANULOCYTES # BLD: 0.07 10*3/MM3 (ref 0–0.03)
IMM GRANULOCYTES NFR BLD: 0.4 % (ref 0–0.6)
INR PPP: 1.07
LYMPHOCYTES # BLD AUTO: 2.67 10*3/MM3 (ref 0.6–4.8)
LYMPHOCYTES NFR BLD AUTO: 16.7 % (ref 24–44)
MCH RBC QN AUTO: 31.6 PG (ref 27–31)
MCHC RBC AUTO-ENTMCNC: 34.1 G/DL (ref 32–36)
MCV RBC AUTO: 92.8 FL (ref 80–99)
MONOCYTES # BLD AUTO: 1.35 10*3/MM3 (ref 0–1)
MONOCYTES NFR BLD AUTO: 8.5 % (ref 0–12)
NEUTROPHILS # BLD AUTO: 11.61 10*3/MM3 (ref 1.5–8.3)
NEUTROPHILS NFR BLD AUTO: 72.8 % (ref 41–71)
PLATELET # BLD AUTO: 251 10*3/MM3 (ref 150–450)
PMV BLD AUTO: 8.8 FL (ref 6–12)
POTASSIUM BLD-SCNC: 4.1 MMOL/L (ref 3.5–5.5)
PROT SERPL-MCNC: 4.9 G/DL (ref 5.7–8.2)
PROTHROMBIN TIME: 11.7 SECONDS (ref 9.6–11.5)
RBC # BLD AUTO: 2.37 10*6/MM3 (ref 4.2–5.76)
SODIUM BLD-SCNC: 137 MMOL/L (ref 132–146)
TROPONIN I SERPL-MCNC: 0.01 NG/ML
UNIT  ABO: NORMAL
UNIT  RH: NORMAL
WBC NRBC COR # BLD: 15.95 10*3/MM3 (ref 3.5–10.8)

## 2017-08-13 PROCEDURE — 85018 HEMOGLOBIN: CPT | Performed by: INTERNAL MEDICINE

## 2017-08-13 PROCEDURE — 85014 HEMATOCRIT: CPT | Performed by: INTERNAL MEDICINE

## 2017-08-13 PROCEDURE — 80053 COMPREHEN METABOLIC PANEL: CPT | Performed by: INTERNAL MEDICINE

## 2017-08-13 PROCEDURE — 36430 TRANSFUSION BLD/BLD COMPNT: CPT

## 2017-08-13 PROCEDURE — 88305 TISSUE EXAM BY PATHOLOGIST: CPT | Performed by: INTERNAL MEDICINE

## 2017-08-13 PROCEDURE — 99233 SBSQ HOSP IP/OBS HIGH 50: CPT | Performed by: INTERNAL MEDICINE

## 2017-08-13 PROCEDURE — 0DB68ZX EXCISION OF STOMACH, VIA NATURAL OR ARTIFICIAL OPENING ENDOSCOPIC, DIAGNOSTIC: ICD-10-PCS | Performed by: INTERNAL MEDICINE

## 2017-08-13 PROCEDURE — 25010000002 PROPOFOL 10 MG/ML EMULSION: Performed by: ANESTHESIOLOGY

## 2017-08-13 PROCEDURE — 85025 COMPLETE CBC W/AUTO DIFF WBC: CPT | Performed by: INTERNAL MEDICINE

## 2017-08-13 PROCEDURE — 93005 ELECTROCARDIOGRAM TRACING: CPT | Performed by: ANESTHESIOLOGY

## 2017-08-13 PROCEDURE — 85730 THROMBOPLASTIN TIME PARTIAL: CPT | Performed by: INTERNAL MEDICINE

## 2017-08-13 PROCEDURE — P9016 RBC LEUKOCYTES REDUCED: HCPCS

## 2017-08-13 PROCEDURE — 84484 ASSAY OF TROPONIN QUANT: CPT | Performed by: INTERNAL MEDICINE

## 2017-08-13 PROCEDURE — 85610 PROTHROMBIN TIME: CPT | Performed by: INTERNAL MEDICINE

## 2017-08-13 PROCEDURE — 86900 BLOOD TYPING SEROLOGIC ABO: CPT

## 2017-08-13 RX ORDER — ONDANSETRON 4 MG/1
8 TABLET, FILM COATED ORAL EVERY 8 HOURS PRN
Status: DISCONTINUED | OUTPATIENT
Start: 2017-08-13 | End: 2017-08-15 | Stop reason: HOSPADM

## 2017-08-13 RX ORDER — SODIUM CHLORIDE, SODIUM LACTATE, POTASSIUM CHLORIDE, CALCIUM CHLORIDE 600; 310; 30; 20 MG/100ML; MG/100ML; MG/100ML; MG/100ML
INJECTION, SOLUTION INTRAVENOUS CONTINUOUS PRN
Status: DISCONTINUED | OUTPATIENT
Start: 2017-08-13 | End: 2017-08-13 | Stop reason: SURG

## 2017-08-13 RX ORDER — PROPOFOL 10 MG/ML
VIAL (ML) INTRAVENOUS AS NEEDED
Status: DISCONTINUED | OUTPATIENT
Start: 2017-08-13 | End: 2017-08-13 | Stop reason: SURG

## 2017-08-13 RX ORDER — PROPOFOL 10 MG/ML
VIAL (ML) INTRAVENOUS CONTINUOUS PRN
Status: DISCONTINUED | OUTPATIENT
Start: 2017-08-13 | End: 2017-08-13 | Stop reason: SURG

## 2017-08-13 RX ORDER — LIDOCAINE HYDROCHLORIDE 10 MG/ML
0.5 INJECTION, SOLUTION EPIDURAL; INFILTRATION; INTRACAUDAL; PERINEURAL ONCE AS NEEDED
Status: DISCONTINUED | OUTPATIENT
Start: 2017-08-13 | End: 2017-08-15 | Stop reason: HOSPADM

## 2017-08-13 RX ORDER — FAMOTIDINE 20 MG/1
20 TABLET, FILM COATED ORAL ONCE
Status: COMPLETED | OUTPATIENT
Start: 2017-08-13 | End: 2017-08-13

## 2017-08-13 RX ORDER — SODIUM CHLORIDE, SODIUM LACTATE, POTASSIUM CHLORIDE, CALCIUM CHLORIDE 600; 310; 30; 20 MG/100ML; MG/100ML; MG/100ML; MG/100ML
9 INJECTION, SOLUTION INTRAVENOUS CONTINUOUS
Status: DISCONTINUED | OUTPATIENT
Start: 2017-08-13 | End: 2017-08-15 | Stop reason: HOSPADM

## 2017-08-13 RX ORDER — ACETAMINOPHEN 325 MG/1
650 TABLET ORAL EVERY 4 HOURS PRN
Status: DISCONTINUED | OUTPATIENT
Start: 2017-08-13 | End: 2017-08-15 | Stop reason: HOSPADM

## 2017-08-13 RX ORDER — SODIUM CHLORIDE 0.9 % (FLUSH) 0.9 %
1-10 SYRINGE (ML) INJECTION AS NEEDED
Status: DISCONTINUED | OUTPATIENT
Start: 2017-08-13 | End: 2017-08-15 | Stop reason: HOSPADM

## 2017-08-13 RX ORDER — SODIUM CHLORIDE 9 MG/ML
250 INJECTION, SOLUTION INTRAVENOUS CONTINUOUS
Status: DISCONTINUED | OUTPATIENT
Start: 2017-08-13 | End: 2017-08-14

## 2017-08-13 RX ORDER — LIDOCAINE HYDROCHLORIDE 10 MG/ML
INJECTION, SOLUTION INFILTRATION; PERINEURAL AS NEEDED
Status: DISCONTINUED | OUTPATIENT
Start: 2017-08-13 | End: 2017-08-13 | Stop reason: SURG

## 2017-08-13 RX ORDER — SODIUM CHLORIDE 0.9 % (FLUSH) 0.9 %
1-10 SYRINGE (ML) INJECTION AS NEEDED
Status: DISCONTINUED | OUTPATIENT
Start: 2017-08-13 | End: 2017-08-14

## 2017-08-13 RX ORDER — NICOTINE 21 MG/24HR
1 PATCH, TRANSDERMAL 24 HOURS TRANSDERMAL EVERY 24 HOURS
Status: DISCONTINUED | OUTPATIENT
Start: 2017-08-13 | End: 2017-08-15 | Stop reason: HOSPADM

## 2017-08-13 RX ADMIN — PROPOFOL 80 MG: 10 INJECTION, EMULSION INTRAVENOUS at 17:30

## 2017-08-13 RX ADMIN — SODIUM CHLORIDE 8 MG/HR: 900 INJECTION INTRAVENOUS at 11:07

## 2017-08-13 RX ADMIN — PROPOFOL 50 MCG/KG/MIN: 10 INJECTION, EMULSION INTRAVENOUS at 17:30

## 2017-08-13 RX ADMIN — SODIUM CHLORIDE 8 MG/HR: 900 INJECTION INTRAVENOUS at 06:19

## 2017-08-13 RX ADMIN — SODIUM CHLORIDE 8 MG/HR: 900 INJECTION INTRAVENOUS at 18:02

## 2017-08-13 RX ADMIN — DEXTROSE AND SODIUM CHLORIDE 75 ML/HR: 5; 450 INJECTION, SOLUTION INTRAVENOUS at 05:30

## 2017-08-13 RX ADMIN — FAMOTIDINE 20 MG: 20 TABLET ORAL at 18:08

## 2017-08-13 RX ADMIN — SODIUM CHLORIDE 8 MG/HR: 900 INJECTION INTRAVENOUS at 14:56

## 2017-08-13 RX ADMIN — GABAPENTIN 600 MG: 300 CAPSULE ORAL at 06:19

## 2017-08-13 RX ADMIN — SODIUM CHLORIDE 8 MG/HR: 900 INJECTION INTRAVENOUS at 01:14

## 2017-08-13 RX ADMIN — DEXTROSE AND SODIUM CHLORIDE 75 ML/HR: 5; 450 INJECTION, SOLUTION INTRAVENOUS at 18:02

## 2017-08-13 RX ADMIN — LIDOCAINE HYDROCHLORIDE 30 MG: 10 INJECTION, SOLUTION INFILTRATION; PERINEURAL at 17:30

## 2017-08-13 RX ADMIN — SODIUM CHLORIDE, POTASSIUM CHLORIDE, SODIUM LACTATE AND CALCIUM CHLORIDE: 600; 310; 30; 20 INJECTION, SOLUTION INTRAVENOUS at 17:27

## 2017-08-13 NOTE — ANESTHESIA POSTPROCEDURE EVALUATION
Patient: Rex Castro    Procedure Summary     Date Anesthesia Start Anesthesia Stop Room / Location    08/13/17 [id number]  [facility] /  [facility]       Procedure Diagnosis Surgeon Provider    ESOPHAGOGASTRODUODENOSCOPY (N/A Esophagus) Acute blood loss anemia  (Acute blood loss anemia [D62]) Mark I Brunner, MD Valerie Ann Gouzd, MD          Anesthesia Type: general  Last vitals  BP     91/55   Temp     97.5   Pulse    72   Resp 20       SpO2     100     Post Anesthesia Care and Evaluation    Patient location during evaluation: ICU  Patient participation: complete - patient participated  Level of consciousness: awake  Pain score: 0  Pain management: adequate  Airway patency: patent  Anesthetic complications: No anesthetic complications  PONV Status: none  Cardiovascular status: acceptable  Respiratory status: acceptable  Hydration status: acceptable    Comments: No apparent anesthesia complications

## 2017-08-13 NOTE — ANESTHESIA PREPROCEDURE EVALUATION
Anesthesia Evaluation     Patient summary reviewed and Nursing notes reviewed   NPO Solid Status: > 8 hours  NPO Liquid Status: > 8 hours     Airway   Mallampati: II  TM distance: >3 FB  Neck ROM: full  possible difficult intubation  Dental    (+) poor dentition    Pulmonary - normal exam   (+) a smoker,   Cardiovascular   Exercise tolerance: good (4-7 METS)    ECG reviewed  Rhythm: regular  Rate: normal    (+) hypertension,       Neuro/Psych- negative ROS  GI/Hepatic/Renal/Endo    (+)  GERD,     Musculoskeletal     (+) arthralgias,   Abdominal    Substance History      OB/GYN          Other   (+) arthritis       Other Comment: anemia                          Anesthesia Plan    ASA 3     general   total IV anesthesia(Labs/studies reviewed)  intravenous induction   Anesthetic plan and risks discussed with patient.  Use of blood products discussed with patient  Consented to blood products.

## 2017-08-13 NOTE — PLAN OF CARE
Problem: Pressure Ulcer Risk (Good Scale) (Adult,Obstetrics,Pediatric)  Goal: Identify Related Risk Factors and Signs and Symptoms  Outcome: Outcome(s) achieved Date Met:  08/13/17  Goal: Skin Integrity  Outcome: Ongoing (interventions implemented as appropriate)    Problem: Fluid Volume Deficit (Adult)  Goal: Identify Related Risk Factors and Signs and Symptoms  Outcome: Outcome(s) achieved Date Met:  08/13/17  Goal: Fluid/Electrolyte Balance  Outcome: Ongoing (interventions implemented as appropriate)  Goal: Comfort/Well Being  Outcome: Ongoing (interventions implemented as appropriate)    Problem: Patient Care Overview (Adult)  Goal: Plan of Care Review  Outcome: Ongoing (interventions implemented as appropriate)    08/12/17 2000 08/13/17 0909   Coping/Psychosocial Response Interventions   Plan Of Care Reviewed With patient;family --    Patient Care Overview   Progress --  no change   Outcome Evaluation   Outcome Summary/Follow up Plan --  Vital signs stable overnight; afebrile. NGT remains to LWS with minimal tan/yellow output. No obvious signs of bleeding and CT scan of abdomen/pelvis unremarkable. Pt's Hgb/Hct steadily declined throughout the night, but asymptomatic. Protonix drip continues. Plans for EGD today.       Goal: Adult Individualization and Mutuality  Outcome: Ongoing (interventions implemented as appropriate)  Goal: Discharge Needs Assessment  Outcome: Ongoing (interventions implemented as appropriate)    Problem: Gastrointestinal Bleeding (Adult)  Goal: Signs and Symptoms of Listed Potential Problems Will be Absent or Manageable (Gastrointestinal Bleeding)  Outcome: Ongoing (interventions implemented as appropriate)

## 2017-08-13 NOTE — BRIEF OP NOTE
ESOPHAGOGASTRODUODENOSCOPY  Procedure Note    Rex Castro  8/12/2017 - 8/13/2017    EGD shows massive, partially-obstructing, 2.5 cm posterior duodenal bulb ulcer with visible vessel. No active bleeding. Lesion is not amenable to endoscopic intervention due to size of ulcer and fibrotic ulcer base.    >> Continue Protonix drip  >> Transfer out of ICU tomorrow if no further signs of bleeding and Hgb stable.  >> Observe in hospital for 48 hours  >> Full liquid diet  >> Needs to avoid NSAIDs.  >> If significant re-bleeding occurs, will need surgical oversew.    Mark I. Brunner, MD     Date: 8/13/2017  Time: 5:40 PM

## 2017-08-13 NOTE — PLAN OF CARE
Problem: Patient Care Overview (Adult)  Goal: Plan of Care Review    08/13/17 1642   Coping/Psychosocial Response Interventions   Plan Of Care Reviewed With patient   Patient Care Overview   Progress no change   Outcome Evaluation   Outcome Summary/Follow up Plan pt hypotensive and low H & H- 2 units transfused. No N/V/D. Protonix gtt and NS cont. Pt to go for EGD this evening with Dr. Brunner

## 2017-08-13 NOTE — PROGRESS NOTES
INTENSIVIST   PROGRESS NOTE     Hospital:  LOS: 1 day     Mr. Rex Castro, 57 y.o. male is followed for:    Anemia ABL    GI bleed    As an Intensivist, we provide an integrated approach to the ICU patient and family, medical management of comorbid conditions, lead interdisciplinary rounds and coordinate the care with all other services, including those from other specialists.     Subjective     S      Interval History:  Uneventful night.  BM X 1: Dark brown.  FOB was positive, but he also has an hemorrhoid.  So far only 1 PRBC (given at [facility]).        The patient's relevant past medical, surgical and social history were reviewed and updated in Epic as appropriate.      Review of Systems  Constitutional: Negative for fever.   Respiratory: Negative for chest pain.   Cardiovascular: Negative for dyspnea.   Gastrointestinal: Negative for  nausea, vomiting and diarrhea.     Objective     O      Vitals    Temp  Min: 97.7 °F (36.5 °C)  Max: 98.9 °F (37.2 °C)  BP  Min: 84/51  Max: 125/71  Pulse  Min: 67  Max: 89  Resp  Min: 10  Max: 20  SpO2  Min: 92 %  Max: 100 %       I/O 24 hrs (7:00AM - 6:59 AM)  Intake & Output (last 3 days)       08/10 0701 - 08/11 0700 08/11 0701 - 08/12 0700 08/12 0701 - 08/13 0700 08/13 0701 - 08/14 0700    P.O.   120     I.V. (mL/kg)   859 (10.2) 446 (5.3)    Other   530     IV Piggyback   247 120    Total Intake(mL/kg)   1756 (20.8) 566 (6.7)    Urine (mL/kg/hr)   1325     Other   250     Stool   0     Total Output     1575      Net     +181 +566            Unmeasured Urine Occurrence   1 x     Unmeasured Stool Occurrence   1 x          Medications (gtts):    dextrose 5 % and sodium chloride 0.45 % Last Rate: 75 mL/hr (08/13/17 0530)   pantoprazole Last Rate: 8 mg/hr (08/13/17 0619)     Physical Examination     Telemetry: Sinus Rhythm: normal sinus rhythm   Constitutional:  No acute distress.  Conversant.   Cardiovascular: Regular rate and rhythm.  No murmurs, rub or gallop.  No  peripheral edema.   Respiratory: Normal respiratory effort.  Clear to ascultation.  Clear to percussion.    Abdominal:  Soft. No masses.   Non-tender. No distension.   No hepatosplenomegaly.  Left inguinal hernia noted, reducible    Neurological:                       Alert and Oriented to person, place, and time.   Moves all extremities.   Psychiatric:  Normal affect.   Normal judgment.   Lines/Drains/Airways: Peripheral IV        Results from last 7 days  Lab Units 08/13/17  0201 08/12/17 2041 08/12/17 1457 08/12/17  0537   WBC 10*3/mm3 15.95*  --  17.15*  --  16.52*   HEMOGLOBIN g/dL 7.5* 7.8* 9.0*  < > 7.8*   MCV fL 92.8  --  93.1  --  95.7*   PLATELETS 10*3/mm3 251  --  286  --  351   < > = values in this interval not displayed.    Results from last 7 days  Lab Units 08/13/17  0201 08/12/17 1457 08/12/17  0411   SODIUM mmol/L 137 137 136   POTASSIUM mmol/L 4.1 4.0 4.2   CO2 mmol/L 24.0 23.0 22.5*   CREATININE mg/dL 0.80 0.70 0.83   MAGNESIUM mg/dL  --  1.8  --    PHOSPHORUS mg/dL  --  2.1*  --      Estimated Creatinine Clearance: 119 mL/min (by C-G formula based on Cr of 0.8).    Results from last 7 days  Lab Units 08/13/17  0201 08/12/17 1457 08/12/17  0411   ALK PHOS U/L 56 61 55   BILIRUBIN mg/dL 0.3 0.3 0.2   ALT (SGPT) U/L 11 7 7*   AST (SGOT) U/L 11 7 10       I reviewed the patient's new laboratory and imaging results.  I independently reviewed the patient's new images.       Assessment/Plan     A / P      57 y.o.male, admitted on 8/12/2017 with Anemia associated with acute blood loss [D62]:      1. Anemia ABL  1. R/O GIB    Results from last 7 days  Lab Units 08/13/17  0201 08/12/17 2041 08/12/17  1457 08/12/17  0818 08/12/17  0537 08/11/17  0104   HEMOGLOBIN g/dL 7.5* 7.8* 9.0* 7.2* 7.8* 12.9*       2. Hypotension:  1. Resposive to fluids and 1 PRBC (given at [place])  3. PMH: HTN  4. Negative LHC 8/10/2017 at [place], [place]  5. Psoriasis     Nutrition:        NPO Diet                                    Advance Directives:        Full Code       Assessment / Plan:  1. EGD today  2. Transfuse 2 PRBCs this morning.  3. Discussed with Dr. Brunner.  4. Continue PPI gtt and re-assess with EGD findings  5. Labs in AM.  6. Discussed with patient and family.    Plan of care and goals reviewed during interdisciplinary rounds.  I discussed the patient's findings and my recommendations with patient, family and nursing staff     Levi Ward MD, FACP, FCCP, [facility]     Intensive Care Medicine and Pulmonary Medicine

## 2017-08-14 PROBLEM — D62 ACUTE BLOOD LOSS ANEMIA: Status: ACTIVE | Noted: 2017-08-12

## 2017-08-14 PROBLEM — K26.4 DUODENAL ULCER WITH HEMORRHAGE: Status: ACTIVE | Noted: 2017-08-14

## 2017-08-14 PROBLEM — D62 ANEMIA ASSOCIATED WITH ACUTE BLOOD LOSS: Status: RESOLVED | Noted: 2017-08-12 | Resolved: 2017-08-14

## 2017-08-14 PROBLEM — K92.2 GI BLEED: Status: RESOLVED | Noted: 2017-08-12 | Resolved: 2017-08-14

## 2017-08-14 PROBLEM — I21.3 STEMI (ST ELEVATION MYOCARDIAL INFARCTION) (HCC): Status: RESOLVED | Noted: 2017-08-10 | Resolved: 2017-08-14

## 2017-08-14 PROBLEM — K52.9 GASTROENTERITIS: Status: RESOLVED | Noted: 2017-08-12 | Resolved: 2017-08-14

## 2017-08-14 LAB
ABO + RH BLD: NORMAL
ABO + RH BLD: NORMAL
ALBUMIN SERPL-MCNC: 2.9 G/DL (ref 3.2–4.8)
ALBUMIN/GLOB SERPL: 1.4 G/DL (ref 1.5–2.5)
ALP SERPL-CCNC: 57 U/L (ref 25–100)
ALT SERPL W P-5'-P-CCNC: 11 U/L (ref 7–40)
ANION GAP SERPL CALCULATED.3IONS-SCNC: 5 MMOL/L (ref 3–11)
AST SERPL-CCNC: 11 U/L (ref 0–33)
BASOPHILS # BLD AUTO: 0.03 10*3/MM3 (ref 0–0.2)
BASOPHILS NFR BLD AUTO: 0.3 % (ref 0–1)
BH BB BLOOD EXPIRATION DATE: NORMAL
BH BB BLOOD EXPIRATION DATE: NORMAL
BH BB BLOOD TYPE BARCODE: 5100
BH BB BLOOD TYPE BARCODE: 5100
BH BB DISPENSE STATUS: NORMAL
BH BB DISPENSE STATUS: NORMAL
BH BB PRODUCT CODE: NORMAL
BH BB PRODUCT CODE: NORMAL
BH BB UNIT NUMBER: NORMAL
BH BB UNIT NUMBER: NORMAL
BILIRUB SERPL-MCNC: 0.3 MG/DL (ref 0.3–1.2)
BUN BLD-MCNC: 11 MG/DL (ref 9–23)
BUN/CREAT SERPL: 15.7 (ref 7–25)
CALCIUM SPEC-SCNC: 8 MG/DL (ref 8.7–10.4)
CHLORIDE SERPL-SCNC: 111 MMOL/L (ref 99–109)
CO2 SERPL-SCNC: 23 MMOL/L (ref 20–31)
CREAT BLD-MCNC: 0.7 MG/DL (ref 0.6–1.3)
CROSSMATCH INTERPRETATION: NORMAL
CROSSMATCH INTERPRETATION: NORMAL
DEPRECATED RDW RBC AUTO: 47.4 FL (ref 37–54)
EOSINOPHIL # BLD AUTO: 0.31 10*3/MM3 (ref 0–0.3)
EOSINOPHIL NFR BLD AUTO: 2.9 % (ref 0–3)
ERYTHROCYTE [DISTWIDTH] IN BLOOD BY AUTOMATED COUNT: 14.3 % (ref 11.3–14.5)
GFR SERPL CREATININE-BSD FRML MDRD: 115 ML/MIN/1.73
GLOBULIN UR ELPH-MCNC: 2.1 GM/DL
GLUCOSE BLD-MCNC: 110 MG/DL (ref 70–100)
HCT VFR BLD AUTO: 23.6 % (ref 38.9–50.9)
HCT VFR BLD AUTO: 23.7 % (ref 38.9–50.9)
HCT VFR BLD AUTO: 25.2 % (ref 38.9–50.9)
HCT VFR BLD AUTO: 26.3 % (ref 38.9–50.9)
HGB BLD-MCNC: 8.2 G/DL (ref 13.1–17.5)
HGB BLD-MCNC: 8.2 G/DL (ref 13.1–17.5)
HGB BLD-MCNC: 8.7 G/DL (ref 13.1–17.5)
HGB BLD-MCNC: 8.9 G/DL (ref 13.1–17.5)
IMM GRANULOCYTES # BLD: 0.04 10*3/MM3 (ref 0–0.03)
IMM GRANULOCYTES NFR BLD: 0.4 % (ref 0–0.6)
LYMPHOCYTES # BLD AUTO: 2.29 10*3/MM3 (ref 0.6–4.8)
LYMPHOCYTES NFR BLD AUTO: 21.7 % (ref 24–44)
MAGNESIUM SERPL-MCNC: 1.9 MG/DL (ref 1.3–2.7)
MCH RBC QN AUTO: 31.3 PG (ref 27–31)
MCHC RBC AUTO-ENTMCNC: 34.6 G/DL (ref 32–36)
MCV RBC AUTO: 90.5 FL (ref 80–99)
MONOCYTES # BLD AUTO: 1.19 10*3/MM3 (ref 0–1)
MONOCYTES NFR BLD AUTO: 11.3 % (ref 0–12)
NEUTROPHILS # BLD AUTO: 6.71 10*3/MM3 (ref 1.5–8.3)
NEUTROPHILS NFR BLD AUTO: 63.4 % (ref 41–71)
PHOSPHATE SERPL-MCNC: 2.5 MG/DL (ref 2.4–5.1)
PLATELET # BLD AUTO: 228 10*3/MM3 (ref 150–450)
PMV BLD AUTO: 8.5 FL (ref 6–12)
POTASSIUM BLD-SCNC: 3.6 MMOL/L (ref 3.5–5.5)
PROT SERPL-MCNC: 5 G/DL (ref 5.7–8.2)
RBC # BLD AUTO: 2.62 10*6/MM3 (ref 4.2–5.76)
SODIUM BLD-SCNC: 139 MMOL/L (ref 132–146)
TROPONIN I SERPL-MCNC: 0.01 NG/ML
UNIT  ABO: NORMAL
UNIT  ABO: NORMAL
UNIT  RH: NORMAL
UNIT  RH: NORMAL
WBC NRBC COR # BLD: 10.57 10*3/MM3 (ref 3.5–10.8)

## 2017-08-14 PROCEDURE — 85014 HEMATOCRIT: CPT | Performed by: INTERNAL MEDICINE

## 2017-08-14 PROCEDURE — 85025 COMPLETE CBC W/AUTO DIFF WBC: CPT | Performed by: INTERNAL MEDICINE

## 2017-08-14 PROCEDURE — 83735 ASSAY OF MAGNESIUM: CPT | Performed by: INTERNAL MEDICINE

## 2017-08-14 PROCEDURE — 85018 HEMOGLOBIN: CPT | Performed by: INTERNAL MEDICINE

## 2017-08-14 PROCEDURE — 84100 ASSAY OF PHOSPHORUS: CPT | Performed by: INTERNAL MEDICINE

## 2017-08-14 PROCEDURE — 99233 SBSQ HOSP IP/OBS HIGH 50: CPT | Performed by: INTERNAL MEDICINE

## 2017-08-14 PROCEDURE — 84484 ASSAY OF TROPONIN QUANT: CPT | Performed by: INTERNAL MEDICINE

## 2017-08-14 PROCEDURE — 80053 COMPREHEN METABOLIC PANEL: CPT | Performed by: INTERNAL MEDICINE

## 2017-08-14 PROCEDURE — 99232 SBSQ HOSP IP/OBS MODERATE 35: CPT | Performed by: INTERNAL MEDICINE

## 2017-08-14 RX ORDER — POTASSIUM CHLORIDE 750 MG/1
40 CAPSULE, EXTENDED RELEASE ORAL AS NEEDED
Status: DISCONTINUED | OUTPATIENT
Start: 2017-08-14 | End: 2017-08-15 | Stop reason: HOSPADM

## 2017-08-14 RX ORDER — MAGNESIUM SULFATE HEPTAHYDRATE 40 MG/ML
2 INJECTION, SOLUTION INTRAVENOUS AS NEEDED
Status: DISCONTINUED | OUTPATIENT
Start: 2017-08-14 | End: 2017-08-15 | Stop reason: HOSPADM

## 2017-08-14 RX ORDER — MAGNESIUM SULFATE HEPTAHYDRATE 40 MG/ML
4 INJECTION, SOLUTION INTRAVENOUS AS NEEDED
Status: DISCONTINUED | OUTPATIENT
Start: 2017-08-14 | End: 2017-08-15 | Stop reason: HOSPADM

## 2017-08-14 RX ORDER — POTASSIUM CHLORIDE 1.5 G/1.77G
40 POWDER, FOR SOLUTION ORAL AS NEEDED
Status: DISCONTINUED | OUTPATIENT
Start: 2017-08-14 | End: 2017-08-15 | Stop reason: HOSPADM

## 2017-08-14 RX ORDER — POTASSIUM CHLORIDE 7.45 MG/ML
10 INJECTION INTRAVENOUS
Status: DISCONTINUED | OUTPATIENT
Start: 2017-08-14 | End: 2017-08-15 | Stop reason: HOSPADM

## 2017-08-14 RX ADMIN — GABAPENTIN 600 MG: 300 CAPSULE ORAL at 14:47

## 2017-08-14 RX ADMIN — MAGNESIUM SULFATE HEPTAHYDRATE 4 G: 40 INJECTION, SOLUTION INTRAVENOUS at 11:19

## 2017-08-14 RX ADMIN — SODIUM CHLORIDE 8 MG/HR: 900 INJECTION INTRAVENOUS at 11:24

## 2017-08-14 RX ADMIN — SODIUM CHLORIDE 8 MG/HR: 900 INJECTION INTRAVENOUS at 06:47

## 2017-08-14 RX ADMIN — SODIUM CHLORIDE 8 MG/HR: 900 INJECTION INTRAVENOUS at 16:27

## 2017-08-14 RX ADMIN — GABAPENTIN 600 MG: 300 CAPSULE ORAL at 21:31

## 2017-08-14 RX ADMIN — POTASSIUM CHLORIDE 40 MEQ: 750 CAPSULE, EXTENDED RELEASE ORAL at 14:47

## 2017-08-14 RX ADMIN — SODIUM CHLORIDE 8 MG/HR: 900 INJECTION INTRAVENOUS at 22:15

## 2017-08-14 RX ADMIN — DEXTROSE AND SODIUM CHLORIDE 75 ML/HR: 5; 450 INJECTION, SOLUTION INTRAVENOUS at 06:47

## 2017-08-14 RX ADMIN — POTASSIUM CHLORIDE 40 MEQ: 750 CAPSULE, EXTENDED RELEASE ORAL at 11:19

## 2017-08-14 RX ADMIN — SODIUM CHLORIDE 8 MG/HR: 900 INJECTION INTRAVENOUS at 01:48

## 2017-08-14 RX ADMIN — GABAPENTIN 600 MG: 300 CAPSULE ORAL at 06:47

## 2017-08-14 RX ADMIN — GABAPENTIN 600 MG: 300 CAPSULE ORAL at 00:34

## 2017-08-14 NOTE — PROGRESS NOTES
"GI Daily Progress Note  Subjective:    Patient reports some mild melena this morning.  No nausea or vomiting.  No abdominal pain.     Objective:    /58  Pulse 70  Temp 97.8 °F (36.6 °C) (Oral)   Resp 20  Ht 71\" (180.3 cm)  Wt 186 lb 8.2 oz (84.6 kg)  SpO2 99%  BMI 26.01 kg/m2    Physical Exam   Constitutional: He is oriented to person, place, and time. No distress.   HENT:   Head: Normocephalic and atraumatic.   Eyes: Conjunctivae are normal. Pupils are equal, round, and reactive to light. No scleral icterus.   Neck: Normal range of motion. Neck supple.   Cardiovascular: Normal rate and regular rhythm.    Pulmonary/Chest: Effort normal and breath sounds normal.   Abdominal: Soft. Bowel sounds are normal. He exhibits no distension. There is no tenderness.   Musculoskeletal: Normal range of motion.   Neurological: He is alert and oriented to person, place, and time.   Skin: Skin is warm and dry.   Psychiatric: He has a normal mood and affect. His behavior is normal.   Nursing note and vitals reviewed.      Lab  Lab Results   Component Value Date    WBC 10.57 08/14/2017    HGB 8.2 (L) 08/14/2017    HGB 8.7 (L) 08/14/2017    HGB 8.2 (L) 08/14/2017    MCV 90.5 08/14/2017     08/14/2017    INR 1.07 08/13/2017    INR 1.00 08/12/2017    INR 1.10 08/12/2017       Lab Results   Component Value Date    GLUCOSE 110 (H) 08/14/2017    BUN 11 08/14/2017    CREATININE 0.70 08/14/2017    EGFRIFNONA 115 08/14/2017    BCR 15.7 08/14/2017    CO2 23.0 08/14/2017    CALCIUM 8.0 (L) 08/14/2017    ALBUMIN 2.90 (L) 08/14/2017    AST 11 08/14/2017    ALT 11 08/14/2017       Assessment:    Massive, partially obstructing 2.5cm posterior duodenal bulb ulcer   Acute blood loss anemia  Epigastric pain, resolved.  Nausea and vomiting, resolved    Plan:    Advance diet  Recommend BID PPI for 1 month, then qdaily   Stop all NSAIDs   Home tomorrow if hemoglobin stable.     Mark I. Brunner, MD  08/14/17  3:41 PM    "

## 2017-08-14 NOTE — PLAN OF CARE
Problem: Fluid Volume Deficit (Adult)  Goal: Fluid/Electrolyte Balance  Outcome: Ongoing (interventions implemented as appropriate)  Goal: Comfort/Well Being  Outcome: Ongoing (interventions implemented as appropriate)    Problem: Patient Care Overview (Adult)  Goal: Plan of Care Review  Outcome: Ongoing (interventions implemented as appropriate)    08/13/17 2000 08/14/17 0738   Coping/Psychosocial Response Interventions   Plan Of Care Reviewed With patient --    Patient Care Overview   Progress --  improving   Outcome Evaluation   Outcome Summary/Follow up Plan --  Vital signs stable overnight; afebrile. No obvious signs of bleeding; no N/V. Protonix drip and IVFs continue. Hct/hgb decreased this AM, but no orders for PRBC transfusion when APRN notified. Plans to transfer to floor today and D/C home tomorrow.       Goal: Adult Individualization and Mutuality  Outcome: Ongoing (interventions implemented as appropriate)  Goal: Discharge Needs Assessment  Outcome: Ongoing (interventions implemented as appropriate)    Problem: Gastrointestinal Bleeding (Adult)  Goal: Signs and Symptoms of Listed Potential Problems Will be Absent or Manageable (Gastrointestinal Bleeding)  Outcome: Ongoing (interventions implemented as appropriate)

## 2017-08-14 NOTE — PROGRESS NOTES
Discharge Planning Assessment  [facility]     Patient Name: Rex Castro  MRN: [id number]  Today's Date: 8/14/2017    Admit Date: 8/12/2017          Discharge Needs Assessment       08/14/17 1035    Living Environment    Lives With significant other    Living Arrangements house    Home Accessibility no concerns    Stair Railings at Home none    Type of Financial/Environmental Concern none    Transportation Available none    Living Environment    Provides Primary Care For no one    Quality Of Family Relationships supportive    Able to Return to Prior Living Arrangements yes    Discharge Needs Assessment    Concerns To Be Addressed discharge planning concerns    Readmission Within The Last 30 Days no previous admission in last 30 days    Anticipated Changes Related to Illness none    Equipment Currently Used at Home none    Equipment Needed After Discharge none    Discharge Facility/Level Of Care Needs home with home health    Discharge Contact Information if Applicable Charisma Bhagat [phone]            Discharge Plan       08/14/17 1035    Case Management/Social Work Plan    Plan Home    Patient/Family In Agreement With Plan yes    Additional Comments Pt lives with his girlfriend in [facility]. He reports he is independent with all ADLs prior to admit. He denies use of any DME, HH, drug/alcohol abuse or history of falls. Pt is followed by his PCP and reports his has medicare part D. His goal for discharge is to return home. CM to follow.        Discharge Placement     No information found                Demographic Summary       08/14/17 1033    Referral Information    Admission Type inpatient    Referral Source physician    Reason For Consult discharge planning    Contact Information    Permission Granted to Share Information With ;family/designee    Primary Care Physician Information    Name Jass Gay            Functional Status       08/14/17 1034    Functional Status Current    Current  Functional Level Comment See Pt eval    Functional Status Prior    Ambulation 0-->independent    Transferring 0-->independent    Toileting 0-->independent    Bathing 0-->independent    Dressing 0-->independent    Eating 0-->independent    Communication 0-->understands/communicates without difficulty    Swallowing 0-->swallows foods/liquids without difficulty    IADL    Medications independent    Meal Preparation independent    Housekeeping independent    Laundry independent    Shopping independent    Oral Care independent            Psychosocial     None            Abuse/Neglect     None            Legal     None            Substance Abuse     None            Patient Forms     None          Kristin Marrufo RN

## 2017-08-14 NOTE — PROGRESS NOTES
"Critical Care Note     LOS: 2 days   Patient Care Team:  Jass Gay MD as PCP - General (Internal Medicine)    Chief Complaint/Reason for visit:  GI bleed      Subjective     Interval History:     57-year-old gentleman presenting with chest pain nausea vomiting. Heart catheterization was done showing normal coronary arteries. He became hypotensive and had hematemesis. He was transferred to [facility]. He received 1 unit of blood at the outside hospital and 3 units of blood here upper endoscopy revealed a large duodenal bulb ulcer, 2.5cm, with a crater and a visible vessel but no active bleeding.    He tolerated a clear liquid diet this morning. He denies further hematemesis. Epigastric pain has improved. He does smoke cigarettes. He takes nonsteroidal agents occasionally but not routinely. He denies chest pain, shortness of air, nausea, diarrhea, palpitations    Review of Systems:    All systems were reviewed and negative except as noted in subjective.    Medical history, surgical history, social history, family history reviewed    Objective     Intake/Output:    Intake/Output Summary (Last 24 hours) at 08/14/17 1058  Last data filed at 08/14/17 1000   Gross per 24 hour   Intake             4324 ml   Output             2800 ml   Net             1524 ml       Nutrition:  Diet Full Liquid    Infusions:    lactated ringers 9 mL/hr    pantoprazole 8 mg/hr Last Rate: 8 mg/hr (08/14/17 0647)       Mechanical Ventilator Settings:        Telemetry:  Sinus Rhythm: normal sinus rhythm          Vital Signs  Blood pressure 97/58, pulse 71, temperature 98.7 °F (37.1 °C), temperature source Oral, resp. rate 16, height 71\" (180.3 cm), weight 186 lb 8.2 oz (84.6 kg), SpO2 98 %.    Physical Exam:  General Appearance:  Well-developed middle-aged gentleman in no distress    Head:  Normocephalic, atraumatic    Eyes:          Pupils equal reactive to light, no jaundice    Ears:     Throat: Mucous membranes moist, no " exudate    Neck: Trachea midline, no JVD, no palpable thyroid    Back:      Lungs:   Symmetric chest excursion without wheeze, rhonchi     Heart:  Regular, normal S1, S2, no murmur    Abdomen:   Nondistended, bowel sounds present, nontender    Rectal:   Deferred   Extremities: No pitting edema or cyanosis    Pulses: Pedal pulses present    Skin: Warm and dry    Lymph nodes:    Neurologic: Alert, oriented, no focal weakness       Results Review:     I reviewed the patient's new clinical results.     Results from last 7 days  Lab Units 08/14/17  0323 08/13/17  0201 08/12/17  1457   SODIUM mmol/L 139 137 137   POTASSIUM mmol/L 3.6 4.1 4.0   CHLORIDE mmol/L 111* 108 111*   CO2 mmol/L 23.0 24.0 23.0   BUN mg/dL 11 22 33*   CREATININE mg/dL 0.70 0.80 0.70   CALCIUM mg/dL 8.0* 8.1* 8.0*   BILIRUBIN mg/dL 0.3 0.3 0.3   ALK PHOS U/L 57 56 61   ALT (SGPT) U/L 11 11 7   AST (SGOT) U/L 11 11 7   GLUCOSE mg/dL 110* 108* 99       Results from last 7 days  Lab Units 08/14/17  0729 08/14/17  0323 08/13/17  1814  08/13/17  0201  08/12/17  1457   WBC 10*3/mm3  --  10.57  --   --  15.95*  --  17.15*   HEMOGLOBIN g/dL 8.7* 8.2* 9.1*  < > 7.5*  < > 9.0*   HEMATOCRIT % 25.2* 23.7* 26.3*  < > 22.0*  < > 26.9*   PLATELETS 10*3/mm3  --  228  --   --  251  --  286   < > = values in this interval not displayed.      No results found for: BLOODCX  No results found for: URINECX    I reviewed the patient's new imaging including images and reports.      All medications reviewed.     gabapentin 600 mg Oral Q8H   nicotine 1 patch Transdermal Q24H         Assessment/Plan     Principal Problem:    Duodenal ulcer with hemorrhage  Active Problems:    Acute blood loss anemia    Hypertension    Arthritis        PLAN:  Continue to monitor Hg and HCT every 6 hours  Full liquid diet  Up as tolerates  Protonix drip  Follow-up biopsy results  Nicotine patch  Smoking cessation  Replace magnesium, potassium  Decrease IV fluids  According to gastroenterology,  they could not treat this visible vessel and if he rebleeds he will need surgery      VTE Prophylaxis:SCDS    Stress Ulcer Prophylaxis: Protonix eloisa Rivera MD  08/14/17  10:58 AM      .

## 2017-08-14 NOTE — PROGRESS NOTES
Multidisciplinary Rounds    Time: 20min  Patient Name: Rex Castro  Date of Encounter: 08/14/17 3:21 PM  MRN: [id number]  Admission date: 8/12/2017      Reason for visit: MDR. RD to continue to follow per protocol.     Additional information obtained during MDR:    Current diet: Diet Full Liquid     MST Total Score: MST score: 0      Intervention:  Follow treatment plan  Care plan reviewed    Follow up:   Per protocol      Cristine Melton RDN, LD  3:21 PM

## 2017-08-15 VITALS
HEART RATE: 74 BPM | OXYGEN SATURATION: 98 % | RESPIRATION RATE: 16 BRPM | SYSTOLIC BLOOD PRESSURE: 114 MMHG | TEMPERATURE: 98.8 F | WEIGHT: 186.51 LBS | DIASTOLIC BLOOD PRESSURE: 67 MMHG | HEIGHT: 71 IN | BODY MASS INDEX: 26.11 KG/M2

## 2017-08-15 LAB
ANION GAP SERPL CALCULATED.3IONS-SCNC: 6 MMOL/L (ref 3–11)
BUN BLD-MCNC: 9 MG/DL (ref 9–23)
BUN/CREAT SERPL: 11.3 (ref 7–25)
CALCIUM SPEC-SCNC: 8.2 MG/DL (ref 8.7–10.4)
CHLORIDE SERPL-SCNC: 107 MMOL/L (ref 99–109)
CO2 SERPL-SCNC: 26 MMOL/L (ref 20–31)
CREAT BLD-MCNC: 0.8 MG/DL (ref 0.6–1.3)
CYTO UR: NORMAL
DEPRECATED RDW RBC AUTO: 49.9 FL (ref 37–54)
ERYTHROCYTE [DISTWIDTH] IN BLOOD BY AUTOMATED COUNT: 14.7 % (ref 11.3–14.5)
GFR SERPL CREATININE-BSD FRML MDRD: 99 ML/MIN/1.73
GLUCOSE BLD-MCNC: 105 MG/DL (ref 70–100)
HCT VFR BLD AUTO: 24.6 % (ref 38.9–50.9)
HCT VFR BLD AUTO: 24.6 % (ref 38.9–50.9)
HGB BLD-MCNC: 8.4 G/DL (ref 13.1–17.5)
HGB BLD-MCNC: 8.4 G/DL (ref 13.1–17.5)
LAB AP CASE REPORT: NORMAL
LAB AP CLINICAL INFORMATION: NORMAL
Lab: NORMAL
MAGNESIUM SERPL-MCNC: 2.2 MG/DL (ref 1.3–2.7)
MCH RBC QN AUTO: 31.9 PG (ref 27–31)
MCHC RBC AUTO-ENTMCNC: 34.1 G/DL (ref 32–36)
MCV RBC AUTO: 93.5 FL (ref 80–99)
PATH REPORT.FINAL DX SPEC: NORMAL
PATH REPORT.GROSS SPEC: NORMAL
PHOSPHATE SERPL-MCNC: 2.2 MG/DL (ref 2.4–5.1)
PLATELET # BLD AUTO: 296 10*3/MM3 (ref 150–450)
PMV BLD AUTO: 9.2 FL (ref 6–12)
POTASSIUM BLD-SCNC: 4 MMOL/L (ref 3.5–5.5)
RBC # BLD AUTO: 2.63 10*6/MM3 (ref 4.2–5.76)
SODIUM BLD-SCNC: 139 MMOL/L (ref 132–146)
TROPONIN I SERPL-MCNC: 0.01 NG/ML
WBC NRBC COR # BLD: 11.64 10*3/MM3 (ref 3.5–10.8)

## 2017-08-15 PROCEDURE — 84484 ASSAY OF TROPONIN QUANT: CPT | Performed by: INTERNAL MEDICINE

## 2017-08-15 PROCEDURE — 83735 ASSAY OF MAGNESIUM: CPT | Performed by: INTERNAL MEDICINE

## 2017-08-15 PROCEDURE — 85018 HEMOGLOBIN: CPT | Performed by: INTERNAL MEDICINE

## 2017-08-15 PROCEDURE — 99239 HOSP IP/OBS DSCHRG MGMT >30: CPT | Performed by: NURSE PRACTITIONER

## 2017-08-15 PROCEDURE — 84100 ASSAY OF PHOSPHORUS: CPT | Performed by: INTERNAL MEDICINE

## 2017-08-15 PROCEDURE — 85014 HEMATOCRIT: CPT | Performed by: INTERNAL MEDICINE

## 2017-08-15 PROCEDURE — 85027 COMPLETE CBC AUTOMATED: CPT | Performed by: INTERNAL MEDICINE

## 2017-08-15 PROCEDURE — 80048 BASIC METABOLIC PNL TOTAL CA: CPT | Performed by: INTERNAL MEDICINE

## 2017-08-15 RX ORDER — PANTOPRAZOLE SODIUM 40 MG/1
40 TABLET, DELAYED RELEASE ORAL 2 TIMES DAILY
Qty: 60 TABLET | Refills: 0 | Status: SHIPPED | OUTPATIENT
Start: 2017-08-15

## 2017-08-15 RX ORDER — PANTOPRAZOLE SODIUM 40 MG/1
40 TABLET, DELAYED RELEASE ORAL DAILY
Qty: 30 TABLET | Refills: 2 | Status: SHIPPED | OUTPATIENT
Start: 2017-09-15

## 2017-08-15 RX ADMIN — GABAPENTIN 600 MG: 300 CAPSULE ORAL at 06:27

## 2017-08-15 RX ADMIN — SODIUM CHLORIDE 8 MG/HR: 900 INJECTION INTRAVENOUS at 03:04

## 2017-08-15 NOTE — DISCHARGE SUMMARY
DISCHARGE SUMMARY     Admit date: 8/12/2017  Date of Discharge:  8/15/2017    Discharge Diagnoses  Hospital Problem List     * (Principal)Duodenal ulcer with hemorrhage    Hypertension    Arthritis    Overview Signed 8/12/2017  2:19 PM by LASHAUN Perkins     Uses Diclofenac.         Acute blood loss anemia    Overview Signed 8/13/2017  2:06 PM by Mark I Brunner, MD     Added automatically from request for surgery [id number]               Past Surgical History:   Procedure Laterality Date   • CARDIAC CATHETERIZATION N/A 8/10/2017    Procedure: Left Heart Cath;  Surgeon: Adonis Sparks MD;  Location: [facility] INVASIVE LOCATION;  Service:    • ENDOSCOPY N/A 8/13/2017    Procedure: ESOPHAGOGASTRODUODENOSCOPY;  Surgeon: Mark I Brunner, MD;  Location: [facility] ENDOSCOPY;  Service:        History of Present Illness    Patient is a 57 y.o. male presented with: Duodenal ulcer with hemorrhage    Rex Castro was transferred from [facility] due to a significant drop in his hematocrit after a negative LHC for atypical chest pain.     He is a 57 y.o. male, present smoker, who initially presented to [facility] with nausea and vomiting.  He further reported a mild, low-grade temperature the previous 2 days.  He initially thought that this was a simple virus.  He denied angina or shortness of air.  EKG at the time showed the possibility of an ST elevation in the inferolateral leads.  Subsequently, he was referred for emergent cardiac catheterization at [facility].  He underwent cardiac catheterization which revealed normal coronary arteries after which he was observed overnight with the plan initially to be discharged home.     On the following day he was observed to have experienced an episode of syncope while going to the restroom.  Initially it was thought that this was related to volume depletion secondary to dehydration related to gastroenteritis.  He was given gentle fluid repletion with normal saline  however overnight his blood pressure remained low.  His hemoglobin was also found to decrease from 12.9-7.2.  He remained NPO, and was transferred to CCU.  An NG tube was placed and bloody discharge was noted at which time he was placed on Protonix infusion with preparations to be transferred to [facility] for further evaluation by gastroenterology.     Upon arrival to the ICU the patient was noted to be awake, alert, and in no acute distress.  Oxygen saturation was % on room air.  The patient is a good historian and denies shortness of air, chest pain, recent melena or hematochezia.  He does have a history of psoriasis as well as hypertension and uses diclofenac for arthritic pain.  He also uses methotrexate.  He denies frequent use of other NSAIDs specifically ibuprofen, naproxen, or aspirin.    Hospital Course    CT scan of the abdomen was done to rule out a retroperitoneal bleed from his recent heart cath and he was found to have a small hematoma adjacent to the right common femoral artery but no retroperitoneal bleed. He also had a left inguinal hernia noted.  Gastroenterology was consulted and an EGD showed a massive, partially obstructing 2.5 cm posterior duodenal bulb ulcer but no active bleeding. The protonix drip was continued. He received 2 units PRBC's and his hemoglobin and hematocrit remained stable. On the day of discharge hemoglobin 8.4 and hematocit 24.6. He was not experiencing abdominal pain, had no further episodes of bleeding, and was eating without difficulty and was felt stable and ready for discharge on 8/15/17. He was counseled on smoking cessation and avoiding NSAID's.       Procedures Performed: Procedure(s):  [id number] UPPER GI ENDOSCOPY    Consults:  Mark I. Brunner, MD, Gastroenterology    Physical Exam:    General: Well developed, well nourished, NAD  HEENT:  Atraumatic, normocephalic  Lungs: Clear and equal bilaterally  Heart: Regular rate and  rhythm with grade 2/6 systolic murmur  Abdomen: Soft, non-tender, non-distended with active bowel sounds  Extremities: No clubbing, cyanosis, or edema, palpable pulses    Pertinent Test Results:     Results from last 7 days  Lab Units 08/15/17  0328   WBC 10*3/mm3 11.64*   HEMOGLOBIN g/dL 8.4*  8.4*   HEMATOCRIT % 24.6*  24.6*   PLATELETS 10*3/mm3 296       Results from last 7 days  Lab Units 08/15/17  0328 08/14/17  0323 08/13/17  0201 08/12/17  1457   SODIUM mmol/L 139 139 137 137   POTASSIUM mmol/L 4.0 3.6 4.1 4.0   CHLORIDE mmol/L 107 111* 108 111*   CO2 mmol/L 26.0 23.0 24.0 23.0   BUN mg/dL 9 11 22 33*   CREATININE mg/dL 0.80 0.70 0.80 0.70   GLUCOSE mg/dL 105* 110* 108* 99   CALCIUM mg/dL 8.2* 8.0* 8.1* 8.0*   PHOSPHORUS mg/dL 2.2* 2.5  --  2.1*       Results from last 7 days  Lab Units 08/12/17  1457   HEMOGLOBIN A1C % 5.60       Condition on Discharge:  Stable    Discharge Disposition: Home or Self Care    Discharge Medications:    Rex Castro   Home Medication Instructions NITO:[id number]    Printed on:08/15/17 0930   Medication Information                      betamethasone dipropionate (DIPROLENE) 0.05 % cream  Apply 1 application topically Daily As Needed for Rash. Applies to elbows and legs             gabapentin (NEURONTIN) 600 MG tablet  Take 600 mg by mouth 3 (Three) Times a Day As Needed (nerve pain).             methotrexate 2.5 MG tablet  Take 10 mg by mouth 1 (One) Time Per Week.             ondansetron (ZOFRAN) 8 MG tablet  Take 1 tablet by mouth Every 8 (Eight) Hours As Needed for Nausea or Vomiting.             pantoprazole (PROTONIX) 40 MG EC tablet  Take 1 tablet by mouth 2 (Two) Times a Day.             pantoprazole (PROTONIX) 40 MG EC tablet  Take 1 tablet by mouth Daily.             traMADol (ULTRAM) 50 MG tablet  Take 50 mg by mouth Every 8 (Eight) Hours As Needed.                 Discharge Diet:   Diet Instructions     Diet: Regular; Thin       Discharge Diet:  Regular   Fluid  Consistency:  Thin                 Activity at Discharge:   Activity Instructions     Activity as Tolerated                     Follow-up Appointments  No future appointments.  Additional Instructions for the Follow-ups that You Need to Schedule     Discharge Follow-up with PCP    As directed    Follow Up Details:  One week   Has the patient’s follow-up appointment been scheduled and documented in the discharge navigator?:  Patient to schedule, documented in the follow-up section           Additional information on Labs and Follow-ups:      Pt to schedule follow-up. Office not open at time of discharge.                   Test Results Pending at Discharge   Order Current Status    Tissue Pathology Exam In process           LASHAUN Gorman  08/15/17  8:59 AM    Discharge Time Spent: 40 Minutes

## 2017-08-15 NOTE — PLAN OF CARE
Problem: Fluid Volume Deficit (Adult)  Goal: Fluid/Electrolyte Balance  Outcome: Outcome(s) achieved Date Met:  08/15/17  Goal: Comfort/Well Being  Outcome: Outcome(s) achieved Date Met:  08/15/17    Problem: Patient Care Overview (Adult)  Goal: Plan of Care Review  Outcome: Ongoing (interventions implemented as appropriate)    08/14/17 2000 08/15/17 0514   Coping/Psychosocial Response Interventions   Plan Of Care Reviewed With patient;family --    Patient Care Overview   Progress --  improving   Outcome Evaluation   Outcome Summary/Follow up Plan --  Vital signs stable; afebrile. Protonix drip continues, hgb/hct stable, pt. tolerating GI soft diet, and no obvious signs of bleeding. Pt. is anxiously waiting to be discharged home today       Goal: Adult Individualization and Mutuality  Outcome: Ongoing (interventions implemented as appropriate)  Goal: Discharge Needs Assessment  Outcome: Ongoing (interventions implemented as appropriate)    Problem: Gastrointestinal Bleeding (Adult)  Goal: Signs and Symptoms of Listed Potential Problems Will be Absent or Manageable (Gastrointestinal Bleeding)  Outcome: Ongoing (interventions implemented as appropriate)

## 2017-08-16 LAB
ABO + RH BLD: NORMAL
BH BB BLOOD EXPIRATION DATE: NORMAL
BH BB BLOOD TYPE BARCODE: 5100
BH BB DISPENSE STATUS: NORMAL
BH BB PRODUCT CODE: NORMAL
BH BB UNIT NUMBER: NORMAL
CROSSMATCH INTERPRETATION: NORMAL
UNIT  ABO: NORMAL
UNIT  RH: NORMAL

## 2017-08-26 DIAGNOSIS — A04.8 H. PYLORI INFECTION: Primary | ICD-10-CM

## 2017-08-26 RX ORDER — LANSOPRAZOLE, AMOXICILLIN, CLARITHROMYCIN 30-500-500
KIT ORAL 2 TIMES DAILY
Qty: 14 KIT | Refills: 0 | Status: SHIPPED | OUTPATIENT
Start: 2017-08-26 | End: 2017-09-09

## 2020-04-11 NOTE — H&P
INTENSIVIST   INITIAL HOSPITAL VISIT NOTE     Hospital:  LOS: 0 days     Mr. Rex Castro, 57 y.o. male is seen for:      Anemia.    Hypertension    As an Intensivist, we provide an integrated approach to the ICU patient and family, medical management of comorbid conditions, lead interdisciplinary rounds and coordinate the care with all other services, including those from other specialists.     Brigitte Castro was transferred from [facility] due to a significant drop in his hematocrit after a negative LHC for atypical chest pain.    He is a 57 y.o. male, present smoker, who initially presented to [facility] with nausea and vomiting.  He further reported a mild, low-grade temperature the previous 2 days.  He initially thought that this was a simple virus.  He denied angina or shortness of air.  EKG at the time showed the possibility of an ST elevation in the inferolateral leads.  Subsequently, he was referred for emergent cardiac catheterization at [facility].  He underwent cardiac catheterization which revealed normal coronary arteries after which he was observed overnight with the plan initially to be discharged home.    Following day he was observed to have experienced an episode of syncope while going to the restroom.  Initially it was thought that this was related to volume depletion secondary to dehydration related to gastroenteritis.  He was given gentle fluid repletion with normal saline however overnight his blood pressure remained low.  His hemoglobin was also found to decrease from 12.9-7.2.  He remained NPO, and was transferred to CCU.  An NG tube was placed and bloody discharge was noted at which time he was placed on Protonix infusion with preparations to be transferred to [facility] for further evaluation by gastroenterology.    Upon arrival to the ICU the patient was noted to be awake, alert, and in no acute distress.  Oxygen saturation was % on room air.   Patient : Jacobo Harris Age: 49 year old Sex: male   MRN: [id number] Encounter Date: 4/11/2020    Pt is Malian speaking. History was obtained with the assistance of a .    History     Chief Complaint   Patient presents with   • Shortness of Breath   • Chest Pain Adult     HPI  O36/O36  4/11/2020  11:46 AM Jacobo Harris is a 49 year old male who presents to the ED for evaluation of shortness of breath that began suddenly last night. Associated symptoms of chest tightness. Among his other symptoms, he notes right some right-leg swelling. 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 He otherwise denies cough, or fever. There are no further complaints or modifying factors at this time.    Chart Review: I reviewed the patient's medications, allergies, and past medical and surgical history in Epic. Patient has a medical history of CAD, diabetes, hypertension, CHF, hyperlipidemia, and PAD.    Echocardiogram from 2/24/2020:      Cardiac Catheterization from 7/16/2019:  Key Findings/Plan        · [id number]     1. Severe 80% ostial right internal carotid stenosis.  2. Severe 80% left subclavian stenosis with 50-60 mmHg gradient  3. Right SFA  involving prior stent successfully treated, three vessel run off               -  directional atherectomy was performed distal and proximal to prior stent               - multiple DCB were used to treat the entire length including in-stent restenosis               - an Everflex 6mm 150mm bare metal stent was implanted proximal to prior stent  4. Residual 70% stenosis right external iliac, previously stented, with approximal 50 mmHg gradient.  5. Residual asymptomatic left SFA in-stent 70% restenosis.     -vascular surgery consult as outpt for carotid disease  -medical management of remaining PVD as patient is asymptomatic  -ASA/plavix  -stop smoking       PCP: Carlo Doherty MD    No Known Allergies    Current Discharge Medication List      Prior to  Admission Medications    Details   HYDROcodone-acetaminophen (NORCO) 5-325 MG per tablet Take 1 tablet by mouth every 4 hours as needed for Pain.  Qty: 45 tablet, Refills: 0      sulfamethoxazole-trimethoprim (BACTRIM DS) 800-160 MG per tablet Take 1 tablet by mouth 2 times daily for 10 days.  Qty: 20 tablet, Refills: 3      rivaroxaban (XARELTO) 2.5 MG Tab Take 1 tablet by mouth 2 times daily.  Qty: 180 tablet, Refills: 1      gabapentin (NEURONTIN) 400 MG capsule Take 1 capsule by mouth 3 times daily.  Qty: 90 capsule, Refills: 0      atorvastatin (LIPITOR) 80 MG tablet Take 1 tablet by mouth daily.  Qty: 90 tablet, Refills: 3      carvedilol (COREG) 12.5 MG tablet Take 1 tablet by mouth 2 times daily (with meals).  Qty: 180 tablet, Refills: 1      nicotine (NICODERM) 21 MG/24HR patch Place 1 patch onto the skin every 24 hours.  Qty: 14 patch, Refills: 0      insulin glargine (LANTUS SOLOSTAR) 100 UNIT/ML pen-injector Inject 50 Units into the skin nightly. Prime 2 units before each dose.  Qty: 15 mL, Refills: 1      Insulin Lispro, 1 Unit Dial, (INSULIN LISPRO) 100 UNIT/ML pen-injector Inject 25 Units into the skin 3 times daily (before meals). Plus scale, max daily dose 93 units.  Qty: 30 mL, Refills: 1      clopidogrel (PLAVIX) 75 MG tablet Take 1 tablet by mouth daily. Do not start before February 28, 2020.  Qty: 30 tablet, Refills: 0      furosemide (LASIX) 20 MG tablet Take 20 mg by mouth daily.      traMADol (ULTRAM) 50 MG tablet Take 1 tablet by mouth every 6 hours as needed for Pain.  Qty: 90 tablet, Refills: 0      docusate sodium-sennosides (SENOKOT S) 50-8.6 MG per tablet Take 1 tablet by mouth 2 times daily.  Qty: 60 tablet, Refills: 0      acetaminophen (TYLENOL) 325 MG tablet Take 1.5 tablets by mouth every 6 hours as needed for Pain.  Qty: 60 tablet, Refills: 0      pantoprazole (PROTONIX) 40 MG tablet Take 40 mg by mouth daily.      fenofibrate (TRICOR) 145 MG tablet Take 145 mg by mouth daily.     The patient is a good historian and denies shortness of air, chest pain, recent melena or hematochezia.  He does have a history of psoriasis as well as hypertension and uses diclofenac for arthritic pain.  He also uses methotrexate.  He denies frequent use of other NSAIDs specifically ibuprofen, naproxen, or aspirin.     PMH: He  has a past medical history of GERD (gastroesophageal reflux disease); Hemorrhoids; Hypertension; Inguinal hernia; Left Inguinal hernia; and Psoriasis.   PSxH: He  has a past surgical history that includes Cardiac catheterization (N/A, 8/10/2017).      Medications:  Medication Sig   • betamethasone dipropionate (DIPROLENE) 0.05 % cream Apply 1 application topically Daily As Needed for Rash. Applies to elbows and legs   • gabapentin (NEURONTIN) 600 MG tablet Take 600 mg by mouth 3 (Three) Times a Day As Needed (nerve pain).   • methotrexate 2.5 MG tablet Take 10 mg by mouth 1 (One) Time Per Week.   • ondansetron (ZOFRAN) 8 MG tablet Take 1 tablet by mouth Every 8 (Eight) Hours As Needed for Nausea or Vomiting.   • sodium chloride 0.9 % solution Infuse 250 mL/hr into a venous catheter Continuous for 2 days.   • traMADol (ULTRAM) 50 MG tablet Take 50 mg by mouth Every 8 (Eight) Hours As Needed.     Allergies: He has No Known Allergies.   FH: His family history includes Cancer in his mother; GI problems in his brother; Heart attack in his mother; Prostate cancer in his father; Rectal cancer in his other.   SH: He  reports that he has been smoking Cigars.  He started smoking about a year ago. He does not have any smokeless tobacco history on file. He reports that he does not drink alcohol or use illicit drugs.     The patient's relevant past medical, surgical and social history were reviewed and updated in Epic as appropriate.     Review of Systems  A 14 point review of systems was obtained from the patient as noted in the history of present illness, all other systems are either negative or    blood glucose lancets Test blood sugar 3 times daily as directed. E11.65 on insulin.  Qty: 200 each, Refills: 2      Blood Glucose Monitoring Suppl (ONE TOUCH ULTRA 2) w/Device Kit Test blood sugar 3 times daily, E11.65 on insulin  Qty: 1 kit, Refills: 0      blood glucose test strip Test blood sugar 3 times daily as directed.  Qty: 100 each, Refills: 2      aspirin 81 MG EC tablet Take 1 tablet by mouth daily.  Qty: 30 tablet, Refills: 6      Insulin Pen Needle (B-D U/F PEN NEEDLE) 31G X 5 MM Misc Use to inject insulin 4 times daily. E11.65. Remove needle cover(s) to expose needle before injecting.  Qty: 200 each, Refills: 2      metformin (GLUCOPHAGE) 1000 MG tablet Take 1,000 mg by mouth 2 times daily (with meals).           Past Medical History:   Diagnosis Date   • Abnormal stress test 03/06/2018   • Anemia 11/14/2019   • Anxiety    • Carotid artery stenosis     Right   • Cellulitis of left lower extremity 10/31/2019   • Coronary artery disease 2015    severe CAD with PCI/stent (done in [place])   • Diabetic ulcer of left foot associated with type 2 diabetes mellitus, with necrosis of bone (CMS/[facility]) 10/05/2019    s/p BKA 11/22/2019   • Difficult intravenous access 02/17/2020    HARDSTICK   • Essential (primary) hypertension    • Fatty liver    • Full dentures     Upper & Lower   • Heart failure, chronic systolic (CMS/[facility]) 10/2/2019   • High cholesterol    • Pancreatitis, acute 09/04/2018    Hospitalized at [facility]   • PVD (peripheral vascular disease) (CMS/[facility]ly [facility]) [id number]    severe, has multiple peripheral stents   • Subacute osteomyelitis of left foot (CMS/[facility]) 11/16/2019    Gangrene and osteomyelitis L 4th metatarsal head S/p L 4,5 ray amputation (11/4) & 3 ray amputation (11/12)   • Type II diabetes mellitus (CMS/[facility]) 2010    Checks blood sugar at home 2-3 times per day   • Uses Uzbek as primary spoken language    • Wears prescription eyeglasses    • Wheelchair dependent     Left lower leg prothesis  noncontributory.    Objective   O     Vitals    Temp  Min: 97.7 °F (36.5 °C)  Max: 99 °F (37.2 °C)  BP  Min: 83/49  Max: 123/65  Pulse  Min: 73  Max: 110  Resp  Min: 10  Max: 20  SpO2  Min: 96 %  Max: 100 % No Data Recorded     I/O 24 hrs (7:00AM - 6:59 AM)  Intake/Output     None        Physical Examination    Telemetry: Sinus Rhythm: normal sinus rhythm   Constitutional:  No acute distress.  Conversant.   HEENT: Normocephalic and atraumatic.   Neck:  Normal range of motion. Neck supple.   No JVD present.   No thyromegaly.    Cardiovascular: Regular rate and rhythm.  No murmurs, rub or gallop.  No peripheral edema.   Respiratory: Normal respiratory effort.  Clear to ascultation.  Clear to percussion.    Abdominal:  Soft. No masses.   Non-tender. No distension.   No hepatosplenomegaly.  Left inguinal hernia noted, reducible    MSK: Normal muscle strength and tone.   Extremities: No digital cyanosis or clubbing.   Skin: Skin is warm and dry.  No rashes, lesions or ulcers noted.    Neurological:   Alert and Oriented to person, place, and time.   Moves all extremities.   Psychiatric:  Normal affect.   Normal judgment.   Lines/Drains/Airways: Peripheral IV, nasogastric tube        Results from last 7 days  Lab Units 08/12/17  1457 08/12/17  0818 08/12/17  0537 08/11/17  0104   WBC 10*3/mm3 17.15*  --  16.52* 12.95*   HEMOGLOBIN g/dL 9.0* 7.2* 7.8* 12.9*   MCV fL 93.1  --  95.7* 93.5   PLATELETS 10*3/mm3 286  --  351 371       Results from last 7 days  Lab Units 08/12/17  1457 08/12/17  0411 08/11/17  0104   SODIUM mmol/L 137 136 136   POTASSIUM mmol/L 4.0 4.2 3.6   CO2 mmol/L 23.0 22.5* 29.7   CREATININE mg/dL 0.70 0.83 1.02   MAGNESIUM mg/dL 1.8  --   --    PHOSPHORUS mg/dL 2.1*  --   --      Estimated Creatinine Clearance: 136 mL/min (by C-G formula based on Cr of 0.7).    Results from last 7 days  Lab Units 08/12/17  1457 08/12/17  0411 08/11/17  0104   ALK PHOS U/L 61 55 81   BILIRUBIN mg/dL 0.3 0.2 0.4   ALT (SGPT)  U/L 7 7* 11   AST (SGOT) U/L 7 10 12     I reviewed the patient's new laboratory and imaging results.  I independently reviewed the patient's new images.  Assessment/Plan   A / P     57 y.o.male, admitted on 8/12/2017 with Anemia associated with acute blood loss [D62]:     1. Anemia ABL  1. R/O GIB  2. R/O other sources: I.e.: retroperitoneal bleed.  2. Hypotension:  1. Resposive to fluids and 1 PRBC (given at [place])  3. PMH: HTN  4. Negative LHC 8/10/2017 at [place], [place]  5. Psoriasis    Nutrition:      Advance Directives: Full Code    Assessment / Plan:    1. ICU admission  2. F/u H/H  3. CT ABD/Pelvis  4. GI Consult: Discussed with Dr. Brunner. Probably EGD tomorrow.  5. Transfusion PRBCs prn Hb < 7-8  6. Protonix infusion, in the meantime    Plan of care and goals reviewed during interdisciplinary rounds.  I discussed the patient's findings and my recommendations with patient, family and nursing staff    Time:   Critical Care Time: was greater than 33 minutes.(excluding time spent on other separately billable procedures or treating other patients).   Patient was at high risk for life-threatening deterioration due to anemia, acute blood loss, hypotension at [place].     LASHAUN Ratliff, [facility]  Pulmonary and Critical Care Service  8/12/2017 2:58 PM     I have personally seen, interviewed and examined the patient and verified all the key components of the history, physical examination, assessment and plan with LASHAUN Harvey and reviewed the note, which reflects my changes and contributions.      Levi Ward MD, FACP, FCCP, [facility]    Intensive Care Medicine and Pulmonary Medicine      when leg fully healed and willing to wear       Past Surgical History:   Procedure Laterality Date   • AMPUTATION Left 11/22/2019    Below Knee Amputation (Dr Condon. [facility])   • CARDIAC CATHERIZATION  03/09/2018    mod diffuse CAD, no intervention. Severe Surendra SFA occl.   • CARDIAC CATHETERIZATION/POSSIBLE PTCA/POSSIBLE STENT  03/03/2020   • CAROTID STENT Bilateral 07/16/2019    Carotid angiography, directional atherectomy/DCB/stenting of long right SFA    • COLONOSCOPY  06/10/2019    Small internal hemorrhoids   • CORONARY ANGIOPLASTY WITH STENT PLACEMENT  2015   • ENDARTERECTOMY Right 02/2020   • ESOPHAGOGASTRODUODENOSCOPY  06/10/2019    Mild Gastritis, Very Small Hiatal Hernia, Irregular Z-line suggestive of gastroesophageal reflux disease at 42 cm   • LOWER EXTREMITY ANGIOGRAMS/POSSIBLE PTA/POSSIBLE STENT  03/03/2020   • PERIPHERAL ANGIOGRAM  2016 LE's (done in [place])   • PERIPHERAL ANGIOGRAM  09/29/2019    Re-look angiography after nearly 36 hours of catheter-directed thrombolysis showed failure of lumen recanalization   • PTA Left 11/01/2019    Severe PAD with critical limb ischemia   • PTA Left 10/04/2019    Left lower extremity critical limb ischemia - had patent prior SFA stent, popliteal stenosis that was not significant by gradient. Anterior tibial artery is occluded at the distal portion into the dorsalis pedis. Antegrade approach was attempted with Whisper wire, All star wire without success.  Retrograde approach was attempted via PT to plantar arch with same wires and BMW wire als   • PTA  09/28/2019    Angioplasty([id number] [place] balloon x3) of entire SFA with good resolution of flow but multifocal areas of moderate stenosis resulting in occlusion at P3, indeterminate distal flow via several collaterals   • PTA  09/27/2019    Complete thrombotic occlusion of L SFA stent /Placement of thrombolytic catheter   • PTA WITH STENT Right 04/20/2018    PTA & Stent to R SFA, and R external iliac  artery   • PTA WITH STENT Left 03/30/2018    2 stents placed to Left SFA   • PTA WITH STENT Left 02/19/2019    External Iliac - stent with EV3 7x40mm self-expanding stent, post-dilated with InPact DCB 6x40mm x 2 inflations   • REMOVAL GALLBLADDER  2007   • TOE AMPUTATION Left 11/04/2019    Fourth and Fifth ray amputation. Dr Condon. [facility]   • TOE AMPUTATION Left 11/12/2019    Third ray amputation with I&D. Dr Condon. [facility]       Family History   Problem Relation Age of Onset   • Heart disease Mother         CAD with stents   • Diabetes Mother    • Kidney disease Father         no dialysis   • Early death Father    • Diabetes Father    • Diabetes Brother    • Diabetes Brother        Social History     Tobacco Use   • Smoking status: Current Every Day Smoker     Packs/day: 1.00     Years: 32.00     Pack years: 32.00     Types: Cigarettes     Start date: 8/8/1986   • Smokeless tobacco: Never Used   • Tobacco comment: smokes 1ppd (2/15/2019),advised to quit   Substance Use Topics   • Alcohol use: Not Currently     Frequency: Never     Drinks per session: 1 or 2     Binge frequency: Never   • Drug use: No       Review of Systems   Constitutional: Negative for chills, diaphoresis and fever.   HENT: Negative for congestion, rhinorrhea and sore throat.    Eyes: Negative for visual disturbance.   Respiratory: Positive for chest tightness and shortness of breath (sudden onset last night). Negative for cough.    Cardiovascular: Positive for leg swelling (right leg (left leg BKA)). Negative for chest pain.   Gastrointestinal: Negative for abdominal pain, diarrhea, nausea and vomiting.   Genitourinary: Negative for dysuria, flank pain, frequency and hematuria.   Musculoskeletal: Negative for myalgias.   Skin: Negative for rash.   Neurological: Negative for dizziness, weakness, light-headedness and headaches.       Physical Exam     ED Triage Vitals [04/11/20 1145]   ED Triage Vitals Group      Temp 98.5 °F (36.9 °C)      Heart  Rate (!) 102      Resp (!) 28      /61      SpO2 (!) 84 %      EtCO2 mmHg       Height       Weight       Weight Scale Used       BMI (Calculated)       IBW/kg (Calculated)        Physical Exam   Constitutional: He is oriented to person, place, and time. He appears well-developed and well-nourished.   HENT:   Mouth/Throat: Oropharynx is clear and moist.   Eyes: Pupils are equal, round, and reactive to light. Conjunctivae are normal.   Neck: Normal range of motion. Neck supple.   Cardiovascular: Regular rhythm, normal heart sounds and intact distal pulses. Tachycardia present.   Pulmonary/Chest: Tachypnea noted. He is in respiratory distress.   Hypoxic at triage  Crackles bilaterally    Abdominal: Soft. Bowel sounds are normal. There is no abdominal tenderness.   Musculoskeletal: Normal range of motion.         General: Edema present. No tenderness.      Comments: S/p L BKA   Neurological: He is alert and oriented to person, place, and time. He has normal strength. No cranial nerve deficit.   Skin: Skin is warm and dry. No rash noted.   Nursing note and vitals reviewed.      ED Course     Procedures    Lab Results     Results for orders placed or performed during the hospital encounter of 04/11/20   Comprehensive Metabolic Panel   Result Value Ref Range    Fasting Status      Sodium 136 135 - 145 mmol/L    Potassium 5.3 (H) 3.4 - 5.1 mmol/L    Chloride 106 98 - 107 mmol/L    Carbon Dioxide 25 21 - 32 mmol/L    Anion Gap 10 10 - 20 mmol/L    Glucose 173 (H) 65 - 99 mg/dL    BUN 38 (H) 6 - 20 mg/dL    Creatinine 1.38 (H) 0.67 - 1.17 mg/dL    Glomerular Filtration Rate 59 (L) >90    BUN/ Creatinine Ratio 28 (H) 7 - 25    Bilirubin, Total 0.3 0.2 - 1.0 mg/dL    GOT/AST 10 <=37 Units/L    Alkaline Phosphatase 67 45 - 117 Units/L    Albumin 3.5 (L) 3.6 - 5.1 g/dL    Protein, Total 8.2 6.4 - 8.2 g/dL    Globulin 4.7 (H) 2.0 - 4.0 g/dL    A/G Ratio 0.7 (L) 1.0 - 2.4    GPT/ALT 17 <64 Units/L    Calcium 9.5 8.4 - 10.2  mg/dL   NT proBNP   Result Value Ref Range    NT-proBNP 1,181 (H) <=125 pg/mL   CBC with Automated Differential (performable only)   Result Value Ref Range    WBC 13.3 (H) 4.2 - 11.0 K/mcL    RBC 4.95 4.50 - 5.90 mil/mcL    HGB 12.1 (L) 13.0 - 17.0 g/dL    HCT 38.4 (L) 39.0 - 51.0 %    MCV 77.6 (L) 78.0 - 100.0 fl    MCH 24.4 (L) 26.0 - 34.0 pg    MCHC 31.5 (L) 32.0 - 36.5 g/dL    RDW-CV 16.2 (H) 11.0 - 15.0 %     140 - 450 K/mcL    NRBC 0 <=0 /100 WBC    Neutrophil, Percent 80 %    Lymphocytes, Percent 15 %    Mono, Percent 3 %    Eosinophils, Percent 1 %    Basophils, Percent 0 %    Immature Granulocytes 1 %    Absolute Neutrophils 10.6 (H) 1.8 - 7.7 K/mcL    Absolute Lymphocytes 2.0 1.0 - 4.0 K/mcL    Absolute Monocytes 0.4 0.3 - 0.9 K/mcL    Absolute Eosinophils  0.1 0.1 - 0.5 K/mcL    Absolute Basophils 0.1 0.0 - 0.3 K/mcL    Absolute Immmature Granulocytes 0.1 0.0 - 0.2 K/mcL    RDW-SD 45.1 39.0 - 50.0 fL   Ferritin   Result Value Ref Range    Ferritin 36 26 - 388 ng/mL   Lactate Dehydrogenase   Result Value Ref Range    LD, Total 195 86 - 234 Units/L   C Reactive Protein   Result Value Ref Range    C-Reactive Protein 1.4 (H) <=1.0 mg/dL   TROPONIN I  POINT OF CARE   Result Value Ref Range    TROPONIN I - POINT OF CARE <0.10 <0.10 ng/mL       EKG Results     EKG Interpretation  Rate: 103  Rhythm:  Sinus tachycardia  Abnormality:  LAD, minimal criteria for LVH.  No ST/T wave changes noted  EKG tracing interpreted by ED physician    Radiology Results     Imaging Results          CT CHEST WO CONTRAST (No Result on File)                XR CHEST AP OR PA - PORTABLE (Final result)  Result time 04/11/20 12:45:02    Final result                 Impression:    IMPRESSION:    Cardiomegaly, central pulmonary venous congestion and suspected pulmonary  edema.             Narrative:    EXAM: XR CHEST AP OR PA    DATE/TIME: 4/11/2020 12:39 PM.     INDICATION: sob.    COMPARISON: Chest radiograph  2/27/2020.    FINDINGS:   The central pulmonary vasculature is congested. Diffuse interstitial and  airspace opacities are scattered throughout both lungs. No pneumothorax. No  sizeable pleural effusion. The heart is mildly enlarged. No acute osseous  findings. Surgical clips project over the right lower neck.                                ED Medication Orders (From admission, onward)    Ordered Start     Status Ordering Provider    04/11/20 1447 04/11/20 1448  furosemide (LASIX INJECT) injection 40 mg  ONCE      Acknowledged SEEMA VALDIVIA    04/11/20 1420 04/11/20 1421    ONCE      Discontinued SEEMA VALDIVIA    04/11/20 1210 04/11/20 1211    ONCE      Discontinued SEEMA VALDIVIA               [facility]    Vitals  Vitals:    04/11/20 1300 04/11/20 1340 04/11/20 1420 04/11/20 1430   BP: 93/62 107/66 123/77 104/71   Pulse: 99 99 (!) 104 (!) 103   Resp: (!) 28 (!) 30 (!) 31 (!) 30   Temp:       TempSrc:       SpO2: 100% 100% 97% 100%       ED Course  11:49 AM  Initial Impression: I entered the room and independently obtained the HPI, ROS, and PEx. Patient complains of sudden onset shortness of breath starting last night. Associated chest tightness. Hypoxic at triage. Plan for further evaluation with labs, EKG, and CXR.    1:12 PM I rechecked the patient. He is resting comfortably in bed. Pt complains of SOB. Will not give lasix given pt's hypotension. Plan for further care in the ICU.     1:36 PM I spoke with Dr. Samano, ICU, regarding the patient's presentation, and the ED work up. We discussed the plan for non-contrast CT chest. Will not give lasix at this time d/t hypotension. Dr. Samano agrees with the plan.    2:39 PM The need for CT scan was discussed multiple times with pt, but he is adamantly refusing the study. Will consult with ICU.    Case discussed with Dr. Samano, ICU.  Patient is adamently refusing chest CT despite sedation.  Plan for rule out Covid infection and treatment for CHF.       [facility]  Critical Care time spent on this patient outside of billable procedures:  35 minutes    3:04 PM Does this patient meet Severe Sepsis criteria by CMS SEP-1 definition? No      3:04 PM Does this patient meet Septic Shock criteria by CMS SEP-1 definition? No      Clinical Impression:  ED Diagnoses        Final diagnoses    SOB (shortness of breath)          Hypoxia                    Pt to be admitted to Dr. Matt Samano in serious condition.       I have reviewed the information recorded by the scribe for accuracy and agree with its contents.    ____________________________________________________________________    Sandra Jay and Steve Armendariz acting as a scribe for Dr. Sussy Yu  Dictation # [id number]  Scribe: Sandra Jay and Steve Yu MD  04/11/20 1517       Sussy Yu MD  04/11/20 1518

## 2022-01-14 ENCOUNTER — HOSPITAL ENCOUNTER (OUTPATIENT)
Dept: HOSPITAL 79 - KOH-I | Age: 62
End: 2022-01-14
Attending: NURSE PRACTITIONER
Payer: MEDICARE

## 2022-01-14 DIAGNOSIS — R05.9: ICD-10-CM

## 2022-01-14 DIAGNOSIS — M54.2: Primary | ICD-10-CM

## 2022-01-14 DIAGNOSIS — M54.9: ICD-10-CM

## 2022-01-14 DIAGNOSIS — M47.812: ICD-10-CM

## 2022-03-21 ENCOUNTER — HOSPITAL ENCOUNTER (INPATIENT)
Dept: HOSPITAL 79 - ER1 | Age: 62
LOS: 2 days | Discharge: HOME | DRG: 683 | End: 2022-03-23
Attending: STUDENT IN AN ORGANIZED HEALTH CARE EDUCATION/TRAINING PROGRAM | Admitting: STUDENT IN AN ORGANIZED HEALTH CARE EDUCATION/TRAINING PROGRAM
Payer: MEDICARE

## 2022-03-21 VITALS — BODY MASS INDEX: 22.24 KG/M2 | WEIGHT: 155.38 LBS | HEIGHT: 70 IN

## 2022-03-21 DIAGNOSIS — Z91.14: ICD-10-CM

## 2022-03-21 DIAGNOSIS — K29.80: ICD-10-CM

## 2022-03-21 DIAGNOSIS — E88.09: ICD-10-CM

## 2022-03-21 DIAGNOSIS — Z20.822: ICD-10-CM

## 2022-03-21 DIAGNOSIS — N17.9: Primary | ICD-10-CM

## 2022-03-21 DIAGNOSIS — K27.9: ICD-10-CM

## 2022-03-21 DIAGNOSIS — F19.10: ICD-10-CM

## 2022-03-21 DIAGNOSIS — F15.10: ICD-10-CM

## 2022-03-21 DIAGNOSIS — Z90.49: ICD-10-CM

## 2022-03-21 DIAGNOSIS — Z87.891: ICD-10-CM

## 2022-03-21 DIAGNOSIS — E86.0: ICD-10-CM

## 2022-03-21 DIAGNOSIS — K29.70: ICD-10-CM

## 2022-03-21 DIAGNOSIS — E44.1: ICD-10-CM

## 2022-03-21 DIAGNOSIS — Z80.9: ICD-10-CM

## 2022-03-21 DIAGNOSIS — N30.00: ICD-10-CM

## 2022-03-21 DIAGNOSIS — D50.9: ICD-10-CM

## 2022-03-21 LAB
BUN/CREATININE RATIO: 16 (ref 0–10)
HGB BLD-MCNC: 8.2 GM/DL (ref 14–17.5)
RED BLOOD COUNT: 3.35 M/UL (ref 4.2–5.5)
WHITE BLOOD COUNT: 26.8 K/UL (ref 4.5–11)

## 2022-03-21 PROCEDURE — U0002 COVID-19 LAB TEST NON-CDC: HCPCS

## 2022-03-21 PROCEDURE — C9113 INJ PANTOPRAZOLE SODIUM, VIA: HCPCS

## 2022-03-22 LAB
BUN/CREATININE RATIO: 16 (ref 0–10)
HGB BLD-MCNC: 9.1 GM/DL (ref 14–17.5)
RED BLOOD COUNT: 3.65 M/UL (ref 4.2–5.5)
WHITE BLOOD COUNT: 19.5 K/UL (ref 4.5–11)

## 2022-03-22 PROCEDURE — 0DJ08ZZ INSPECTION OF UPPER INTESTINAL TRACT, VIA NATURAL OR ARTIFICIAL OPENING ENDOSCOPIC: ICD-10-PCS | Performed by: SURGERY

## 2022-03-23 LAB
BUN/CREATININE RATIO: 15 (ref 0–10)
HGB BLD-MCNC: 8 GM/DL (ref 14–17.5)
RED BLOOD COUNT: 3.21 M/UL (ref 4.2–5.5)
WHITE BLOOD COUNT: 15.1 K/UL (ref 4.5–11)

## 2022-03-23 PROCEDURE — B24BZZZ ULTRASONOGRAPHY OF HEART WITH AORTA: ICD-10-PCS | Performed by: INTERNAL MEDICINE

## 2023-12-08 DIAGNOSIS — I48.92 ATRIAL FLUTTER WITH CONTROLLED RESPONSE: Primary | ICD-10-CM

## 2023-12-11 ENCOUNTER — ANESTHESIA (OUTPATIENT)
Dept: CARDIOLOGY | Facility: HOSPITAL | Age: 63
End: 2023-12-11
Payer: MEDICARE

## 2023-12-11 ENCOUNTER — HOSPITAL ENCOUNTER (OUTPATIENT)
Dept: CARDIOLOGY | Facility: HOSPITAL | Age: 63
Discharge: HOME | End: 2023-12-11
Payer: MEDICARE

## 2023-12-11 ENCOUNTER — ANESTHESIA EVENT (OUTPATIENT)
Dept: CARDIOLOGY | Facility: HOSPITAL | Age: 63
End: 2023-12-11
Payer: MEDICARE

## 2023-12-11 VITALS
OXYGEN SATURATION: 100 % | HEIGHT: 71 IN | BODY MASS INDEX: 25.2 KG/M2 | DIASTOLIC BLOOD PRESSURE: 64 MMHG | HEART RATE: 57 BPM | SYSTOLIC BLOOD PRESSURE: 101 MMHG | WEIGHT: 180 LBS | RESPIRATION RATE: 20 BRPM

## 2023-12-11 DIAGNOSIS — I48.92 ATRIAL FLUTTER WITH CONTROLLED RESPONSE: ICD-10-CM

## 2023-12-11 PROCEDURE — 25010000002 PROPOFOL 10 MG/ML EMULSION: Performed by: NURSE ANESTHETIST, CERTIFIED REGISTERED

## 2023-12-11 PROCEDURE — 92960 CARDIOVERSION ELECTRIC EXT: CPT

## 2023-12-11 PROCEDURE — 25810000003 LACTATED RINGERS PER 1000 ML: Performed by: NURSE ANESTHETIST, CERTIFIED REGISTERED

## 2023-12-11 RX ORDER — PROPOFOL 10 MG/ML
VIAL (ML) INTRAVENOUS AS NEEDED
Status: DISCONTINUED | OUTPATIENT
Start: 2023-12-11 | End: 2023-12-11 | Stop reason: SURG

## 2023-12-11 RX ORDER — SODIUM CHLORIDE, SODIUM LACTATE, POTASSIUM CHLORIDE, CALCIUM CHLORIDE 600; 310; 30; 20 MG/100ML; MG/100ML; MG/100ML; MG/100ML
INJECTION, SOLUTION INTRAVENOUS CONTINUOUS PRN
Status: DISCONTINUED | OUTPATIENT
Start: 2023-12-11 | End: 2023-12-11 | Stop reason: SURG

## 2023-12-11 RX ORDER — FAMOTIDINE 20 MG/1
20 TABLET, FILM COATED ORAL DAILY
COMMUNITY

## 2023-12-11 RX ORDER — FOLIC ACID 1 MG/1
1 TABLET ORAL DAILY
COMMUNITY

## 2023-12-11 RX ORDER — AMLODIPINE BESYLATE 5 MG/1
5 TABLET ORAL DAILY
COMMUNITY

## 2023-12-11 RX ADMIN — SODIUM CHLORIDE, POTASSIUM CHLORIDE, SODIUM LACTATE AND CALCIUM CHLORIDE: 600; 310; 30; 20 INJECTION, SOLUTION INTRAVENOUS at 08:06

## 2023-12-11 RX ADMIN — PROPOFOL 50 MG: 10 INJECTION, EMULSION INTRAVENOUS at 08:09

## 2023-12-11 NOTE — DISCHARGE INSTR - APPOINTMENTS
Appointment with Dr. Mi on December 19 at 1:00pm [facility]    [FreeTextEntry1] : renal insufficiency

## 2023-12-11 NOTE — ANESTHESIA PREPROCEDURE EVALUATION
Anesthesia Evaluation     Patient summary reviewed and Nursing notes reviewed   NPO Solid Status: > 8 hours  NPO Liquid Status: > 8 hours           Airway   Mallampati: I  Dental    (+) poor dentition    Pulmonary    (+) ,decreased breath sounds  Cardiovascular     ECG reviewed  PT is on anticoagulation therapy  Patient on routine beta blocker  Rhythm: irregular  Rate: normal    (+) hypertension      Neuro/Psych  GI/Hepatic/Renal/Endo    (+) GERD, PUD, GI bleeding     Musculoskeletal     Abdominal    Substance History      OB/GYN          Other                    Anesthesia Plan    ASA 3     general   total IV anesthesia  intravenous induction     Anesthetic plan, risks, benefits, and alternatives have been provided, discussed and informed consent has been obtained with: patient.    Use of blood products discussed with patient .      CODE STATUS:

## 2023-12-11 NOTE — ANESTHESIA POSTPROCEDURE EVALUATION
Patient: Rex Castro    Procedure Summary       Date: 12/11/23 Room / Location: [facility]    Anesthesia Start: 0807 Anesthesia Stop: 0812    Procedure: CARDIOVERSION EXTERNAL IN CARDIOLOGY DEPARTMENT Diagnosis:       Atrial flutter with controlled response      (Atrial flutter)    Scheduled Providers: Sabrina Mi MD Provider: Antoinette Nuñez CRNA    Anesthesia Type: general ASA Status: 3            Anesthesia Type: general    Vitals  No vitals data found for the desired time range.          Post Anesthesia Care and Evaluation    Patient location during evaluation: bedside  Patient participation: complete - patient participated  Level of consciousness: awake and alert  Pain score: 1  Pain management: adequate    Airway patency: patent  Anesthetic complications: No anesthetic complications  PONV Status: controlled  Cardiovascular status: acceptable  Respiratory status: acceptable  Hydration status: acceptable    Comments: 58  97%  98  16  92/66  No anesthesia care post op

## 2023-12-12 NOTE — H&P
Cardiology  History and Physical    Patient Identification:  Name:  Rex Castro  Age:  63 y.o.  Sex:  male  :  1960  MRN:  [id number]  Visit Number:  [id number]  Primary care provider:  Jass Gay MD    Subjective       Reason for the visit:  Electrical cardioversion for atrial flutter    Chief complain  ' Atrial flutter'      History of present illness:      63-year-old male came as an outpatient for electrical cardioversion for persistent atrial flutter.  He had pulmonary embolism last month and then developed atrial flutter and has been persistent.  No history of palpitations, chest discomfort or increased shortness of breath.    Review of Systems  Constitutional-none, ENT-none, cardiovascular-history of atrial flutter, pulmonary-history of pulmonary embolism, GI-acid reflux, musculoskeletal-arthritis, integumentary-history of psoriasis.  Complete review done.    History  Past Medical History:   Diagnosis Date    Atrial arrhythmia     GERD (gastroesophageal reflux disease)     Hemorrhoids     Hypertension     Inguinal hernia     Left Inguinal hernia     Psoriasis    ,   Past Surgical History:   Procedure Laterality Date    CARDIAC CATHETERIZATION N/A 8/10/2017    Procedure: Left Heart Cath;  Surgeon: Adonis Sparks MD;  Location:  [facility];  Service:     ENDOSCOPY N/A 2017    Procedure: ESOPHAGOGASTRODUODENOSCOPY;  Surgeon: Mark I Brunner, MD;  Location: [facility] ENDOSCOPY;  Service:    ,   Family History   Problem Relation Age of Onset    Cancer Mother     Heart attack Mother     Prostate cancer Father     GI problems Brother     Rectal cancer Other    ,   Social History     Tobacco Use    Smoking status: Some Days     Types: Cigars     Start date: 8/10/2016    Tobacco comments:     smokes cigar every once in awhile   Substance Use Topics    Alcohol use: No    Drug use: No     Comment: Use to smoke marijuana x 40yrs        Medication Review:  Scheduled  "Meds:  Continuous Infusions:No current facility-administered medications for this encounter.    PRN Meds:.        Allergies:  Patient has no known allergies.  Objective     Objective     Vital Sign Min/Max for last 24 hours    BP-122/70 mmHg, heart rate-72-irregular, respiration-18 and afebrile    Flowsheet Rows      Flowsheet Row First Filed Value   Admission Height 180.3 cm (71\") Documented at 12/11/2023 0800   Admission Weight 81.6 kg (180 lb) Documented at 12/11/2023 0800               Physical Exam:    General Appearance:  HEENT:   Neck:     Alert, cooperative, in no acute distress  Grossly normal   No JVD    Lungs:   Clear to auscultation,respirations regular, No added sounds     Heart:  Irregular rhythm.  Normal heart rate.  No S3 or S4.  No murmurs.    Chest Wall:  No abnormalities observed    Abdomen   Soft, nontender, no masses, no organomegaly and bowel sounds are heard.     Extremities: Pedal edema    Pulses: Pulses palpable and equal bilaterally    Neurologic: No focal deficits          Assessment      Persistent atrial flutter with controlled ventricular rate    Plan     Planned for electrical cardioversion.  Adequately anticoagulated for more than 4 weeks.  I explained the procedure, its purpose, benefit and risk.  Agreeable to proceed.      Sabrina Mi MD, FACC  12/12/23  17:58 EST          "

## 2024-02-26 PROBLEM — I44.0 FIRST DEGREE HEART BLOCK: Status: ACTIVE | Noted: 2024-02-26

## 2024-02-26 PROBLEM — F19.10 DRUG ABUSE: Status: ACTIVE | Noted: 2024-02-26

## 2024-02-26 PROBLEM — Z86.711 HISTORY OF PULMONARY EMBOLISM: Status: ACTIVE | Noted: 2023-11-17

## 2024-02-26 PROBLEM — M25.561 BILATERAL KNEE PAIN: Status: ACTIVE | Noted: 2024-02-26

## 2024-02-26 PROBLEM — J18.9 PNEUMONIA: Status: ACTIVE | Noted: 2024-02-26

## 2024-02-26 PROBLEM — S80.212A ABRASION OF KNEE, LEFT: Status: ACTIVE | Noted: 2024-02-26

## 2024-02-26 PROBLEM — R03.0 ELEVATED BLOOD-PRESSURE READING WITHOUT DIAGNOSIS OF HYPERTENSION: Status: ACTIVE | Noted: 2024-02-26

## 2024-02-26 PROBLEM — R19.8 PERFORATED VISCUS: Status: ACTIVE | Noted: 2023-11-17

## 2024-02-26 PROBLEM — M51.36 DEGENERATIVE DISC DISEASE, LUMBAR: Status: ACTIVE | Noted: 2024-02-26

## 2024-02-26 PROBLEM — M47.812 CERVICAL SPINE ARTHRITIS: Status: ACTIVE | Noted: 2023-11-17

## 2024-02-26 PROBLEM — S14.129A CENTRAL CORD SYNDROME: Status: ACTIVE | Noted: 2024-02-26

## 2024-02-26 PROBLEM — G61.0 GUILLAIN BARRÉ SYNDROME: Chronic | Status: ACTIVE | Noted: 2023-09-23

## 2024-02-26 PROBLEM — K66.8 PNEUMOPERITONEUM: Status: ACTIVE | Noted: 2024-02-26

## 2024-02-26 PROBLEM — R00.1 BRADYCARDIA: Status: ACTIVE | Noted: 2024-02-26

## 2024-02-26 PROBLEM — D72.829 LEUKOCYTOSIS: Status: ACTIVE | Noted: 2024-02-26

## 2024-02-26 PROBLEM — B99.9 INFECTIOUS DISEASE: Status: ACTIVE | Noted: 2024-02-26

## 2024-02-26 PROBLEM — I26.92 SADDLE EMBOLUS OF PULMONARY ARTERY: Status: ACTIVE | Noted: 2023-11-02

## 2024-02-26 PROBLEM — K26.5 PERFORATED DUODENAL ULCER: Status: ACTIVE | Noted: 2017-08-14

## 2024-02-26 PROBLEM — M25.562 BILATERAL KNEE PAIN: Status: ACTIVE | Noted: 2024-02-26

## 2024-02-26 PROBLEM — I51.7 LEFT VENTRICULAR HYPERTROPHY BY ELECTROCARDIOGRAM: Status: ACTIVE | Noted: 2024-02-26

## 2024-02-26 PROBLEM — R53.81 DEBILITY: Status: ACTIVE | Noted: 2024-02-26

## 2024-02-26 PROBLEM — A53.9 SYPHILIS: Status: ACTIVE | Noted: 2024-02-26

## 2024-02-26 PROBLEM — K56.609 SMALL BOWEL OBSTRUCTION: Status: ACTIVE | Noted: 2024-02-26

## 2024-02-26 PROBLEM — Z23: Status: ACTIVE | Noted: 2024-02-26

## 2024-02-26 PROBLEM — I48.92 ATRIAL FLUTTER: Chronic | Status: ACTIVE | Noted: 2023-11-04

## 2024-02-26 PROBLEM — G61.0 GUILLAIN BARRÉ SYNDROME: Status: ACTIVE | Noted: 2023-09-23

## 2024-02-26 PROBLEM — K21.9 GERD (GASTROESOPHAGEAL REFLUX DISEASE): Status: ACTIVE | Noted: 2024-02-26

## 2024-02-26 PROBLEM — I10 BENIGN ESSENTIAL HYPERTENSION: Status: ACTIVE | Noted: 2017-08-12

## 2024-02-26 RX ORDER — DOXYCYCLINE HYCLATE 100 MG/1
CAPSULE ORAL
COMMUNITY

## 2024-02-26 RX ORDER — ONDANSETRON 4 MG/1
TABLET, ORALLY DISINTEGRATING ORAL
COMMUNITY

## 2024-02-26 RX ORDER — MULTIVITAMIN WITH FOLIC ACID 400 MCG
1 TABLET ORAL DAILY
COMMUNITY

## 2024-02-26 RX ORDER — PREDNISONE 10 MG/1
TABLET ORAL
COMMUNITY

## 2024-02-26 RX ORDER — BISACODYL 5 MG/1
TABLET, DELAYED RELEASE ORAL
COMMUNITY
Start: 2023-10-13

## 2024-02-26 RX ORDER — METOPROLOL SUCCINATE 25 MG/1
12.5 TABLET, EXTENDED RELEASE ORAL DAILY
COMMUNITY
Start: 2023-11-17 | End: 2024-11-16

## 2024-02-26 RX ORDER — MELOXICAM 15 MG/1
TABLET ORAL
COMMUNITY

## 2024-02-26 RX ORDER — ACETAMINOPHEN 500 MG
1000 TABLET ORAL EVERY 6 HOURS PRN
COMMUNITY
Start: 2023-11-04

## 2024-02-26 RX ORDER — LINACLOTIDE 72 UG/1
1 CAPSULE, GELATIN COATED ORAL DAILY
COMMUNITY
Start: 2023-11-16

## 2024-02-27 ENCOUNTER — OFFICE VISIT (OUTPATIENT)
Dept: PULMONOLOGY | Facility: CLINIC | Age: 64
End: 2024-02-27
Payer: MEDICARE

## 2024-02-27 VITALS
WEIGHT: 180 LBS | OXYGEN SATURATION: 98 % | SYSTOLIC BLOOD PRESSURE: 122 MMHG | DIASTOLIC BLOOD PRESSURE: 80 MMHG | HEIGHT: 71 IN | HEART RATE: 82 BPM | TEMPERATURE: 97.8 F | BODY MASS INDEX: 25.2 KG/M2

## 2024-02-27 DIAGNOSIS — R93.89 ABNORMAL CT OF THE CHEST: ICD-10-CM

## 2024-02-27 DIAGNOSIS — Z86.711 HISTORY OF PULMONARY EMBOLISM: ICD-10-CM

## 2024-02-27 DIAGNOSIS — R06.02 SOB (SHORTNESS OF BREATH): Primary | ICD-10-CM
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
## 2024-03-27 ENCOUNTER — HOSPITAL ENCOUNTER (OUTPATIENT)
Dept: RESPIRATORY THERAPY | Facility: HOSPITAL | Age: 64
Discharge: HOME | End: 2024-03-27
Payer: MEDICARE

## 2024-03-27 ENCOUNTER — HOSPITAL ENCOUNTER (OUTPATIENT)
Dept: CT IMAGING | Facility: HOSPITAL | Age: 64
Discharge: HOME | End: 2024-03-27
Payer: MEDICARE

## 2024-03-27 DIAGNOSIS — R93.89 ABNORMAL CT OF THE CHEST: ICD-10-CM

## 2024-03-27 DIAGNOSIS — R06.02 SOB (SHORTNESS OF BREATH): ICD-10-CM

## 2024-03-27 PROCEDURE — 94729 DIFFUSING CAPACITY: CPT

## 2024-03-27 PROCEDURE — 94060 EVALUATION OF WHEEZING: CPT

## 2024-03-27 PROCEDURE — 94640 AIRWAY INHALATION TREATMENT: CPT

## 2024-03-27 PROCEDURE — 94726 PLETHYSMOGRAPHY LUNG VOLUMES: CPT

## 2024-03-27 PROCEDURE — 71250 CT THORAX DX C-: CPT

## 2024-03-27 RX ORDER — ALBUTEROL SULFATE 2.5 MG/3ML
2.5 SOLUTION RESPIRATORY (INHALATION) ONCE
Status: COMPLETED | OUTPATIENT
Start: 2024-03-27 | End: 2024-03-27

## 2024-03-27 RX ADMIN — ALBUTEROL SULFATE 2.5 MG: 2.5 SOLUTION RESPIRATORY (INHALATION) at 09:16

## 2024-04-03 ENCOUNTER — TELEPHONE (OUTPATIENT)
Dept: PULMONOLOGY | Facility: CLINIC | Age: 64
End: 2024-04-03
Payer: MEDICARE

## 2024-04-03 NOTE — TELEPHONE ENCOUNTER
----- Message from Nikhil Murillo MD sent at 3/30/2024  6:13 AM EDT -----  Ct chest - is showing a mass. I recommend bronchoscopy.    Please let the patient know.   I can do it on 11th of April.    Ty  ss  ----- Message -----  From: Interface, Rad Results Native In  Sent: 3/27/2024  10:34 AM EDT  To: Nikhil Murillo MD

## 2024-04-18 ENCOUNTER — TELEPHONE (OUTPATIENT)
Dept: PULMONOLOGY | Facility: CLINIC | Age: 64
End: 2024-04-18
Payer: MEDICARE

## 2024-04-18 DIAGNOSIS — R93.89 ABNORMAL CT OF THE CHEST: Primary | ICD-10-CM

## 2024-04-18 DIAGNOSIS — R94.5 ABNORMAL RESULTS OF LIVER FUNCTION STUDIES: ICD-10-CM

## 2024-04-18 NOTE — TELEPHONE ENCOUNTER
----- Message from Nikhil Murillo MD sent at 4/18/2024  2:14 PM EDT -----  Ok orders are in. Please ask him to get blood work up tomorrow  Stop taking eliquis from Saturday.  So her will not take it on sat Sunday and Monday.  His procedure is on Tuesday.    Ty  ss  ----- Message -----  From: Stephanie Castro MA  Sent: 4/18/2024  11:08 AM EDT  To: Nikhil Murillo MD    23rd is perfect. He also takes Eliquis 5mg BID.  ----- Message -----  From: Nikhil Murillo MD  Sent: 4/18/2024  10:30 AM EDT  To: Stephanie Castro MA    Ok   I can do navigational on 23rd. Is that ok with family ?  Let me know asap    Ty  ss  ----- Message -----  From: Stephanie Castro MA  Sent: 4/18/2024   8:29 AM EDT  To: Nikhil Murillo MD    This is what you sent back:       This is a good case for navigational bronch.   Let me talk to the navigational bronch rep and see when can I have a dc next time.   May be around 22nd -23rd of this month.   So wait till I talk to him     And he is prepared for a bronch next week but doesn't have any details yet.  ----- Message -----  From: Nikhil Murillo MD  Sent: 4/18/2024   7:23 AM EDT  To: Stephanie Castro MA    Did I reply to this message ?    If I did- what's the plan ?    Ty  ss  ----- Message -----  From: Stephanie Castro MA  Sent: 4/8/2024  11:40 AM EDT  To: Nikhil Murillo MD    His daughter has been out of the country and just got back in town this weekend. They want to move forward with the bronch. You have quiet a few this week, do you think you can squeeze him in somewhere?

## 2024-04-19 ENCOUNTER — PRE-ADMISSION TESTING (OUTPATIENT)
Dept: PREADMISSION TESTING | Facility: HOSPITAL | Age: 64
End: 2024-04-19
Payer: MEDICARE

## 2024-04-19 DIAGNOSIS — R94.5 ABNORMAL RESULTS OF LIVER FUNCTION STUDIES: ICD-10-CM

## 2024-04-19 DIAGNOSIS — R93.89 ABNORMAL CT OF THE CHEST: ICD-10-CM

## 2024-04-19 LAB
ANION GAP SERPL CALCULATED.3IONS-SCNC: 11.9 MMOL/L (ref 5–15)
BASOPHILS # BLD AUTO: 0.06 10*3/MM3 (ref 0–0.2)
BASOPHILS NFR BLD AUTO: 0.6 % (ref 0–1.5)
BUN SERPL-MCNC: 15 MG/DL (ref 8–23)
BUN/CREAT SERPL: 15.5 (ref 7–25)
CALCIUM SPEC-SCNC: 9.1 MG/DL (ref 8.6–10.5)
CHLORIDE SERPL-SCNC: 102 MMOL/L (ref 98–107)
CO2 SERPL-SCNC: 22.1 MMOL/L (ref 22–29)
CREAT SERPL-MCNC: 0.97 MG/DL (ref 0.76–1.27)
DEPRECATED RDW RBC AUTO: 51.2 FL (ref 37–54)
EGFRCR SERPLBLD CKD-EPI 2021: 86.1 ML/MIN/1.73
EOSINOPHIL # BLD AUTO: 1.18 10*3/MM3 (ref 0–0.4)
EOSINOPHIL NFR BLD AUTO: 11.8 % (ref 0.3–6.2)
ERYTHROCYTE [DISTWIDTH] IN BLOOD BY AUTOMATED COUNT: 14.6 % (ref 12.3–15.4)
GLUCOSE SERPL-MCNC: 93 MG/DL (ref 65–99)
HCT VFR BLD AUTO: 45.9 % (ref 37.5–51)
HGB BLD-MCNC: 15.7 G/DL (ref 13–17.7)
IMM GRANULOCYTES # BLD AUTO: 0.07 10*3/MM3 (ref 0–0.05)
IMM GRANULOCYTES NFR BLD AUTO: 0.7 % (ref 0–0.5)
INR PPP: 0.98 (ref 0.9–1.1)
LYMPHOCYTES # BLD AUTO: 2.14 10*3/MM3 (ref 0.7–3.1)
LYMPHOCYTES NFR BLD AUTO: 21.4 % (ref 19.6–45.3)
MCH RBC QN AUTO: 32.8 PG (ref 26.6–33)
MCHC RBC AUTO-ENTMCNC: 34.2 G/DL (ref 31.5–35.7)
MCV RBC AUTO: 95.8 FL (ref 79–97)
MONOCYTES # BLD AUTO: 0.92 10*3/MM3 (ref 0.1–0.9)
MONOCYTES NFR BLD AUTO: 9.2 % (ref 5–12)
NEUTROPHILS NFR BLD AUTO: 5.63 10*3/MM3 (ref 1.7–7)
NEUTROPHILS NFR BLD AUTO: 56.3 % (ref 42.7–76)
NRBC BLD AUTO-RTO: 0 /100 WBC (ref 0–0.2)
PLATELET # BLD AUTO: 264 10*3/MM3 (ref 140–450)
PMV BLD AUTO: 9.5 FL (ref 6–12)
POTASSIUM SERPL-SCNC: 4.2 MMOL/L (ref 3.5–5.2)
PROTHROMBIN TIME: 13.5 SECONDS (ref 12.1–14.7)
RBC # BLD AUTO: 4.79 10*6/MM3 (ref 4.14–5.8)
SODIUM SERPL-SCNC: 136 MMOL/L (ref 136–145)
WBC NRBC COR # BLD AUTO: 10 10*3/MM3 (ref 3.4–10.8)

## 2024-04-19 PROCEDURE — 36415 COLL VENOUS BLD VENIPUNCTURE: CPT

## 2024-04-19 PROCEDURE — 85025 COMPLETE CBC W/AUTO DIFF WBC: CPT

## 2024-04-19 PROCEDURE — 85610 PROTHROMBIN TIME: CPT

## 2024-04-19 PROCEDURE — 80048 BASIC METABOLIC PNL TOTAL CA: CPT

## 2024-04-23 ENCOUNTER — HOSPITAL ENCOUNTER (EMERGENCY)
Facility: HOSPITAL | Age: 64
Discharge: LEFT BEFORE DISCHARGE | End: 2024-04-23
Attending: EMERGENCY MEDICINE
Payer: MEDICARE

## 2024-04-23 ENCOUNTER — ANESTHESIA EVENT (OUTPATIENT)
Dept: PERIOP | Facility: HOSPITAL | Age: 64
End: 2024-04-23
Payer: MEDICARE

## 2024-04-23 ENCOUNTER — APPOINTMENT (OUTPATIENT)
Dept: GENERAL RADIOLOGY | Facility: HOSPITAL | Age: 64
End: 2024-04-23
Payer: MEDICARE

## 2024-04-23 ENCOUNTER — ANESTHESIA (OUTPATIENT)
Dept: PERIOP | Facility: HOSPITAL | Age: 64
End: 2024-04-23
Payer: MEDICARE

## 2024-04-23 ENCOUNTER — HOSPITAL ENCOUNTER (OUTPATIENT)
Facility: HOSPITAL | Age: 64
Setting detail: HOSPITAL OUTPATIENT SURGERY
Discharge: HOME | End: 2024-04-23
Attending: INTERNAL MEDICINE | Admitting: INTERNAL MEDICINE
Payer: MEDICARE

## 2024-04-23 VITALS
HEART RATE: 66 BPM | RESPIRATION RATE: 18 BRPM | TEMPERATURE: 97 F | HEIGHT: 71 IN | DIASTOLIC BLOOD PRESSURE: 79 MMHG | SYSTOLIC BLOOD PRESSURE: 115 MMHG | WEIGHT: 190 LBS | BODY MASS INDEX: 26.6 KG/M2 | OXYGEN SATURATION: 96 %

## 2024-04-23 VITALS
HEIGHT: 71 IN | OXYGEN SATURATION: 98 % | SYSTOLIC BLOOD PRESSURE: 129 MMHG | BODY MASS INDEX: 26.6 KG/M2 | DIASTOLIC BLOOD PRESSURE: 73 MMHG | TEMPERATURE: 98.4 F | HEART RATE: 51 BPM | RESPIRATION RATE: 20 BRPM | WEIGHT: 190.04 LBS

## 2024-04-23 DIAGNOSIS — R93.89 ABNORMAL CT OF THE CHEST: ICD-10-CM

## 2024-04-23 DIAGNOSIS — I48.91 ATRIAL FIBRILLATION WITH SLOW VENTRICULAR RESPONSE: Primary | ICD-10-CM

## 2024-04-23 LAB
ALBUMIN SERPL-MCNC: 3.8 G/DL (ref 3.5–5.2)
ALBUMIN/GLOB SERPL: 1.4 G/DL
ALP SERPL-CCNC: 87 U/L (ref 39–117)
ALT SERPL W P-5'-P-CCNC: 12 U/L (ref 1–41)
ANION GAP SERPL CALCULATED.3IONS-SCNC: 12.1 MMOL/L (ref 5–15)
AST SERPL-CCNC: 12 U/L (ref 1–40)
BASOPHILS # BLD AUTO: 0.06 10*3/MM3 (ref 0–0.2)
BASOPHILS NFR BLD AUTO: 0.5 % (ref 0–1.5)
BILIRUB SERPL-MCNC: 0.2 MG/DL (ref 0–1.2)
BUN SERPL-MCNC: 19 MG/DL (ref 8–23)
BUN/CREAT SERPL: 17.3 (ref 7–25)
CALCIUM SPEC-SCNC: 8.9 MG/DL (ref 8.6–10.5)
CHLORIDE SERPL-SCNC: 106 MMOL/L (ref 98–107)
CO2 SERPL-SCNC: 20.9 MMOL/L (ref 22–29)
CREAT SERPL-MCNC: 1.1 MG/DL (ref 0.76–1.27)
DEPRECATED RDW RBC AUTO: 52.4 FL (ref 37–54)
EGFRCR SERPLBLD CKD-EPI 2021: 74 ML/MIN/1.73
EOSINOPHIL # BLD AUTO: 0.89 10*3/MM3 (ref 0–0.4)
EOSINOPHIL NFR BLD AUTO: 8.1 % (ref 0.3–6.2)
ERYTHROCYTE [DISTWIDTH] IN BLOOD BY AUTOMATED COUNT: 14.5 % (ref 12.3–15.4)
GLOBULIN UR ELPH-MCNC: 2.7 GM/DL
GLUCOSE SERPL-MCNC: 99 MG/DL (ref 65–99)
HCT VFR BLD AUTO: 43.6 % (ref 37.5–51)
HGB BLD-MCNC: 14.4 G/DL (ref 13–17.7)
IMM GRANULOCYTES # BLD AUTO: 0.04 10*3/MM3 (ref 0–0.05)
IMM GRANULOCYTES NFR BLD AUTO: 0.4 % (ref 0–0.5)
LYMPHOCYTES # BLD AUTO: 1.97 10*3/MM3 (ref 0.7–3.1)
LYMPHOCYTES NFR BLD AUTO: 17.9 % (ref 19.6–45.3)
MCH RBC QN AUTO: 32.6 PG (ref 26.6–33)
MCHC RBC AUTO-ENTMCNC: 33 G/DL (ref 31.5–35.7)
MCV RBC AUTO: 98.6 FL (ref 79–97)
MONOCYTES # BLD AUTO: 0.88 10*3/MM3 (ref 0.1–0.9)
MONOCYTES NFR BLD AUTO: 8 % (ref 5–12)
NEUTROPHILS NFR BLD AUTO: 65.1 % (ref 42.7–76)
NEUTROPHILS NFR BLD AUTO: 7.18 10*3/MM3 (ref 1.7–7)
NRBC BLD AUTO-RTO: 0 /100 WBC (ref 0–0.2)
PLATELET # BLD AUTO: 235 10*3/MM3 (ref 140–450)
PMV BLD AUTO: 9.2 FL (ref 6–12)
POTASSIUM SERPL-SCNC: 4.2 MMOL/L (ref 3.5–5.2)
PROT SERPL-MCNC: 6.5 G/DL (ref 6–8.5)
QT INTERVAL: 386 MS
QT INTERVAL: 418 MS
QTC INTERVAL: 365 MS
QTC INTERVAL: 404 MS
RBC # BLD AUTO: 4.42 10*6/MM3 (ref 4.14–5.8)
SODIUM SERPL-SCNC: 139 MMOL/L (ref 136–145)
TROPONIN T SERPL HS-MCNC: 21 NG/L
TROPONIN T SERPL HS-MCNC: 22 NG/L
WBC NRBC COR # BLD AUTO: 11.02 10*3/MM3 (ref 3.4–10.8)

## 2024-04-23 PROCEDURE — 87102 FUNGUS ISOLATION CULTURE: CPT | Performed by: INTERNAL MEDICINE

## 2024-04-23 PROCEDURE — 99284 EMERGENCY DEPT VISIT MOD MDM: CPT

## 2024-04-23 PROCEDURE — 93005 ELECTROCARDIOGRAM TRACING: CPT | Performed by: NURSE ANESTHETIST, CERTIFIED REGISTERED

## 2024-04-23 PROCEDURE — 25810000003 LACTATED RINGERS PER 1000 ML: Performed by: ANESTHESIOLOGY

## 2024-04-23 PROCEDURE — 36415 COLL VENOUS BLD VENIPUNCTURE: CPT

## 2024-04-23 PROCEDURE — 94799 UNLISTED PULMONARY SVC/PX: CPT

## 2024-04-23 PROCEDURE — 87206 SMEAR FLUORESCENT/ACID STAI: CPT | Performed by: INTERNAL MEDICINE

## 2024-04-23 PROCEDURE — 25010000002 GLYCOPYRROLATE 0.4 MG/2ML SOLUTION: Performed by: NURSE ANESTHETIST, CERTIFIED REGISTERED

## 2024-04-23 PROCEDURE — 87116 MYCOBACTERIA CULTURE: CPT | Performed by: INTERNAL MEDICINE

## 2024-04-23 PROCEDURE — 93010 ELECTROCARDIOGRAM REPORT: CPT | Performed by: INTERNAL MEDICINE

## 2024-04-23 PROCEDURE — 31624 DX BRONCHOSCOPE/LAVAGE: CPT | Performed by: INTERNAL MEDICINE

## 2024-04-23 PROCEDURE — 31629 BRONCHOSCOPY/NEEDLE BX EACH: CPT | Performed by: INTERNAL MEDICINE

## 2024-04-23 PROCEDURE — 87071 CULTURE AEROBIC QUANT OTHER: CPT | Performed by: INTERNAL MEDICINE

## 2024-04-23 PROCEDURE — 76000 FLUOROSCOPY <1 HR PHYS/QHP: CPT

## 2024-04-23 PROCEDURE — 76000 FLUOROSCOPY <1 HR PHYS/QHP: CPT | Performed by: RADIOLOGY

## 2024-04-23 PROCEDURE — 31627 NAVIGATIONAL BRONCHOSCOPY: CPT | Performed by: INTERNAL MEDICINE

## 2024-04-23 PROCEDURE — 93005 ELECTROCARDIOGRAM TRACING: CPT | Performed by: EMERGENCY MEDICINE

## 2024-04-23 PROCEDURE — 87205 SMEAR GRAM STAIN: CPT | Performed by: INTERNAL MEDICINE

## 2024-04-23 PROCEDURE — 84484 ASSAY OF TROPONIN QUANT: CPT | Performed by: EMERGENCY MEDICINE

## 2024-04-23 PROCEDURE — 80053 COMPREHEN METABOLIC PANEL: CPT | Performed by: EMERGENCY MEDICINE

## 2024-04-23 PROCEDURE — 25010000002 MIDAZOLAM PER 1 MG: Performed by: NURSE ANESTHETIST, CERTIFIED REGISTERED

## 2024-04-23 PROCEDURE — 31623 DX BRONCHOSCOPE/BRUSH: CPT | Performed by: INTERNAL MEDICINE

## 2024-04-23 PROCEDURE — 25010000002 NEOSTIGMINE 10 MG/10ML SOLUTION: Performed by: NURSE ANESTHETIST, CERTIFIED REGISTERED

## 2024-04-23 PROCEDURE — 25010000002 PROPOFOL 200 MG/20ML EMULSION: Performed by: NURSE ANESTHETIST, CERTIFIED REGISTERED

## 2024-04-23 PROCEDURE — 88112 CYTOPATH CELL ENHANCE TECH: CPT

## 2024-04-23 PROCEDURE — 94640 AIRWAY INHALATION TREATMENT: CPT

## 2024-04-23 PROCEDURE — 88305 TISSUE EXAM BY PATHOLOGIST: CPT

## 2024-04-23 PROCEDURE — 25010000002 ONDANSETRON PER 1 MG: Performed by: NURSE ANESTHETIST, CERTIFIED REGISTERED

## 2024-04-23 PROCEDURE — 25010000002 FENTANYL CITRATE (PF) 50 MCG/ML SOLUTION: Performed by: NURSE ANESTHETIST, CERTIFIED REGISTERED

## 2024-04-23 PROCEDURE — 71045 X-RAY EXAM CHEST 1 VIEW: CPT | Performed by: RADIOLOGY

## 2024-04-23 PROCEDURE — 71045 X-RAY EXAM CHEST 1 VIEW: CPT

## 2024-04-23 PROCEDURE — 88173 CYTOPATH EVAL FNA REPORT: CPT

## 2024-04-23 PROCEDURE — 85025 COMPLETE CBC W/AUTO DIFF WBC: CPT | Performed by: EMERGENCY MEDICINE

## 2024-04-23 RX ORDER — SODIUM CHLORIDE 9 MG/ML
INJECTION, SOLUTION INTRAVENOUS CONTINUOUS PRN
Status: COMPLETED | OUTPATIENT
Start: 2024-04-23 | End: 2024-04-23

## 2024-04-23 RX ORDER — SODIUM CHLORIDE 0.9 % (FLUSH) 0.9 %
10 SYRINGE (ML) INJECTION EVERY 12 HOURS SCHEDULED
Status: DISCONTINUED | OUTPATIENT
Start: 2024-04-23 | End: 2024-04-23 | Stop reason: HOSPADM

## 2024-04-23 RX ORDER — SODIUM CHLORIDE, SODIUM LACTATE, POTASSIUM CHLORIDE, CALCIUM CHLORIDE 600; 310; 30; 20 MG/100ML; MG/100ML; MG/100ML; MG/100ML
125 INJECTION, SOLUTION INTRAVENOUS ONCE
Status: COMPLETED | OUTPATIENT
Start: 2024-04-23 | End: 2024-04-23

## 2024-04-23 RX ORDER — SODIUM CHLORIDE 0.9 % (FLUSH) 0.9 %
10 SYRINGE (ML) INJECTION AS NEEDED
Status: DISCONTINUED | OUTPATIENT
Start: 2024-04-23 | End: 2024-04-23 | Stop reason: HOSPADM

## 2024-04-23 RX ORDER — NEOSTIGMINE METHYLSULFATE 1 MG/ML
INJECTION, SOLUTION INTRAVENOUS AS NEEDED
Status: DISCONTINUED | OUTPATIENT
Start: 2024-04-23 | End: 2024-04-23 | Stop reason: SURG

## 2024-04-23 RX ORDER — MEPERIDINE HYDROCHLORIDE 25 MG/ML
12.5 INJECTION INTRAMUSCULAR; INTRAVENOUS; SUBCUTANEOUS
Status: DISCONTINUED | OUTPATIENT
Start: 2024-04-23 | End: 2024-04-23 | Stop reason: HOSPADM

## 2024-04-23 RX ORDER — ONDANSETRON 2 MG/ML
INJECTION INTRAMUSCULAR; INTRAVENOUS AS NEEDED
Status: DISCONTINUED | OUTPATIENT
Start: 2024-04-23 | End: 2024-04-23 | Stop reason: SURG

## 2024-04-23 RX ORDER — IPRATROPIUM BROMIDE AND ALBUTEROL SULFATE 2.5; .5 MG/3ML; MG/3ML
3 SOLUTION RESPIRATORY (INHALATION) ONCE AS NEEDED
Status: DISCONTINUED | OUTPATIENT
Start: 2024-04-23 | End: 2024-04-23 | Stop reason: HOSPADM

## 2024-04-23 RX ORDER — FENTANYL CITRATE 50 UG/ML
50 INJECTION, SOLUTION INTRAMUSCULAR; INTRAVENOUS
Status: DISCONTINUED | OUTPATIENT
Start: 2024-04-23 | End: 2024-04-23 | Stop reason: HOSPADM

## 2024-04-23 RX ORDER — SODIUM CHLORIDE, SODIUM LACTATE, POTASSIUM CHLORIDE, CALCIUM CHLORIDE 600; 310; 30; 20 MG/100ML; MG/100ML; MG/100ML; MG/100ML
100 INJECTION, SOLUTION INTRAVENOUS ONCE AS NEEDED
Status: DISCONTINUED | OUTPATIENT
Start: 2024-04-23 | End: 2024-04-23 | Stop reason: HOSPADM

## 2024-04-23 RX ORDER — EPHEDRINE SULFATE 5 MG/ML
INJECTION INTRAVENOUS AS NEEDED
Status: DISCONTINUED | OUTPATIENT
Start: 2024-04-23 | End: 2024-04-23 | Stop reason: SURG

## 2024-04-23 RX ORDER — GLYCOPYRROLATE 0.2 MG/ML
INJECTION INTRAMUSCULAR; INTRAVENOUS AS NEEDED
Status: DISCONTINUED | OUTPATIENT
Start: 2024-04-23 | End: 2024-04-23 | Stop reason: SURG

## 2024-04-23 RX ORDER — ALBUTEROL SULFATE 2.5 MG/3ML
2.5 SOLUTION RESPIRATORY (INHALATION) ONCE
Status: COMPLETED | OUTPATIENT
Start: 2024-04-23 | End: 2024-04-23

## 2024-04-23 RX ORDER — PROPOFOL 10 MG/ML
INJECTION, EMULSION INTRAVENOUS AS NEEDED
Status: DISCONTINUED | OUTPATIENT
Start: 2024-04-23 | End: 2024-04-23 | Stop reason: SURG

## 2024-04-23 RX ORDER — MIDAZOLAM HYDROCHLORIDE 1 MG/ML
0.5 INJECTION INTRAMUSCULAR; INTRAVENOUS
Status: DISCONTINUED | OUTPATIENT
Start: 2024-04-23 | End: 2024-04-23 | Stop reason: HOSPADM

## 2024-04-23 RX ORDER — LIDOCAINE HYDROCHLORIDE 20 MG/ML
INJECTION, SOLUTION EPIDURAL; INFILTRATION; INTRACAUDAL; PERINEURAL AS NEEDED
Status: DISCONTINUED | OUTPATIENT
Start: 2024-04-23 | End: 2024-04-23 | Stop reason: SURG

## 2024-04-23 RX ORDER — ONDANSETRON 2 MG/ML
4 INJECTION INTRAMUSCULAR; INTRAVENOUS AS NEEDED
Status: DISCONTINUED | OUTPATIENT
Start: 2024-04-23 | End: 2024-04-23 | Stop reason: HOSPADM

## 2024-04-23 RX ORDER — OXYCODONE HYDROCHLORIDE AND ACETAMINOPHEN 5; 325 MG/1; MG/1
1 TABLET ORAL ONCE AS NEEDED
Status: DISCONTINUED | OUTPATIENT
Start: 2024-04-23 | End: 2024-04-23 | Stop reason: HOSPADM

## 2024-04-23 RX ORDER — ROCURONIUM BROMIDE 10 MG/ML
INJECTION, SOLUTION INTRAVENOUS AS NEEDED
Status: DISCONTINUED | OUTPATIENT
Start: 2024-04-23 | End: 2024-04-23 | Stop reason: SURG

## 2024-04-23 RX ORDER — LIDOCAINE HYDROCHLORIDE 40 MG/ML
3 INJECTION, SOLUTION RETROBULBAR ONCE
Status: COMPLETED | OUTPATIENT
Start: 2024-04-23 | End: 2024-04-23

## 2024-04-23 RX ORDER — SODIUM CHLORIDE 9 MG/ML
40 INJECTION, SOLUTION INTRAVENOUS AS NEEDED
Status: DISCONTINUED | OUTPATIENT
Start: 2024-04-23 | End: 2024-04-23 | Stop reason: HOSPADM

## 2024-04-23 RX ORDER — FENTANYL CITRATE 50 UG/ML
INJECTION, SOLUTION INTRAMUSCULAR; INTRAVENOUS AS NEEDED
Status: DISCONTINUED | OUTPATIENT
Start: 2024-04-23 | End: 2024-04-23 | Stop reason: SURG

## 2024-04-23 RX ORDER — FAMOTIDINE 10 MG/ML
INJECTION, SOLUTION INTRAVENOUS AS NEEDED
Status: DISCONTINUED | OUTPATIENT
Start: 2024-04-23 | End: 2024-04-23 | Stop reason: SURG

## 2024-04-23 RX ADMIN — EPHEDRINE SULFATE 5 MG: 5 INJECTION INTRAVENOUS at 13:23

## 2024-04-23 RX ADMIN — EPHEDRINE SULFATE 10 MG: 5 INJECTION INTRAVENOUS at 13:01

## 2024-04-23 RX ADMIN — SODIUM CHLORIDE, POTASSIUM CHLORIDE, SODIUM LACTATE AND CALCIUM CHLORIDE: 600; 310; 30; 20 INJECTION, SOLUTION INTRAVENOUS at 12:30

## 2024-04-23 RX ADMIN — MIDAZOLAM HYDROCHLORIDE 2 MG: 1 INJECTION, SOLUTION INTRAMUSCULAR; INTRAVENOUS at 12:30

## 2024-04-23 RX ADMIN — EPHEDRINE SULFATE 10 MG: 5 INJECTION INTRAVENOUS at 13:09

## 2024-04-23 RX ADMIN — EPHEDRINE SULFATE 15 MG: 5 INJECTION INTRAVENOUS at 13:29

## 2024-04-23 RX ADMIN — EPHEDRINE SULFATE 15 MG: 5 INJECTION INTRAVENOUS at 13:25

## 2024-04-23 RX ADMIN — LIDOCAINE HYDROCHLORIDE 3 ML: 40 INJECTION, SOLUTION RETROBULBAR; TOPICAL at 08:25

## 2024-04-23 RX ADMIN — ONDANSETRON 4 MG: 2 INJECTION INTRAMUSCULAR; INTRAVENOUS at 12:40

## 2024-04-23 RX ADMIN — EPHEDRINE SULFATE 10 MG: 5 INJECTION INTRAVENOUS at 12:42

## 2024-04-23 RX ADMIN — ROCURONIUM BROMIDE 30 MG: 10 SOLUTION INTRAVENOUS at 12:34

## 2024-04-23 RX ADMIN — FAMOTIDINE 20 MG: 10 INJECTION, SOLUTION INTRAVENOUS at 12:30

## 2024-04-23 RX ADMIN — LIDOCAINE HYDROCHLORIDE 60 MG: 20 INJECTION, SOLUTION EPIDURAL; INFILTRATION; INTRACAUDAL; PERINEURAL at 12:34

## 2024-04-23 RX ADMIN — ALBUTEROL SULFATE 2.5 MG: 2.5 SOLUTION RESPIRATORY (INHALATION) at 08:20

## 2024-04-23 RX ADMIN — GLYCOPYRROLATE 0.4 MG: 0.4 INJECTION INTRAMUSCULAR; INTRAVENOUS at 13:33

## 2024-04-23 RX ADMIN — FENTANYL CITRATE 100 MCG: 50 INJECTION INTRAMUSCULAR; INTRAVENOUS at 12:34

## 2024-04-23 RX ADMIN — NEOSTIGMINE METHYLSULFATE 3 MG: 0.5 INJECTION INTRAVENOUS at 13:33

## 2024-04-23 RX ADMIN — PROPOFOL 150 MG: 10 INJECTION, EMULSION INTRAVENOUS at 12:34

## 2024-04-23 NOTE — OP NOTE
Bronchoscopy Procedure Note    Date of Operation: 4/23/2024    Pre-op Diagnosis: Abnormal CT chest abnormal CT chest with right middle lobe    Post-op Diagnosis: Pulmonary nodule    Surgeon: Nikhil Murillo MD    Assistants: None    Anesthesia: Please see anesthesia report for details    Operation: EBUS, radial EBUS, navigational bronc, flexible fiberoptic bronchoscopy, diagnostic     Findings: No endobronchial masses or lesions were seen on normal bronchoscopy.  On endobronchial ultrasound-no mediastinal lymphadenopathy was identified     On navigational bronchoscopy with radial EBUS with the help of fluoroscopy the right middle lobe mass was identified and biopsied        Specimen: Right middle lobe BAL with the help of navigational bronch  Right middle lobe brushings with the help of navigational bronch  Right middle lobe nodule/mass-navigational bronchoscopy guided, radial bronchoscopy guided and fluoroscopic guided transbronchial needle aspiration of the nodule was done 5 times with multiple passes each time.    Estimated Blood Loss: Minimal    Complications:   None  Indications and History:  The patient is a 64 y.o. male with abnormal CT chest.  The risks, benefits, complications, treatment options and expected outcomes were discussed with the patient.  The possibilities of reaction to medication, pulmonary aspiration, perforation of a viscus, bleeding, failure to diagnose a condition and creating a complication requiring transfusion or operation were discussed with the patient who freely signed the consent.      Description of Procedure:  The patient was seen in the Holding Room and the site of surgery properly noted/marked.  The patient was taken to endoscopy suite, identified as Rex Castro and the procedure verified as Flexible Fiberoptic Bronchoscopy.  A Time Out was held and the above information confirmed.     the patient was positioned  and the bronchoscope was passed through the endotracheal tube. .  The scope was then passed into the trachea.      2 ml 1 % lidocaine was used topically on the cyrus.  Careful inspection of the tracheal lumen was accomplished. The scope was sequentially passed into the left main and then left upper and lower bronchi and segmental bronchi.       The scope was then withdrawn and advanced into the right main bronchus and then into the RUL, RML, and RLL bronchi and segmental bronchi.     No endobronchial masses or lesions were seen on normal bronchoscopy.  On endobronchial ultrasound-no mediastinal lymphadenopathy was identified     On navigational bronchoscopy with radial EBUS with the help of fluoroscopy the right middle lobe mass was identified and biopsied    Samples-  Bronchial washings    The navigational bronchoscope was introduced.  Virtual pathway was followed.  The nodule was identified with the radial endobronchial ultrasound bronchoscope.    Right middle lobe nodule/mass-navigational bronchoscopy guided, radial bronchoscopy guided and fluoroscopic guided transbronchial needle aspiration of the nodule was done 5 times with multiple passes each time.       Right middle lobe BAL with the help of navigational bronch    Right middle lobe brushings with the help of navigational bronch          During and after the procedure-patient went into atrial flutter.  Discussed with anesthesia and was sent to the ER for further evaluation.  If patient does not have any bleeding-hemoptysis Eliquis can be restarted from tomorrow.      Samples were sent for cytology and microbiology.  Please follow up the results  The Patient was taken to the Endoscopy Recovery area in satisfactory condition.        Nikhil Murillo MD

## 2024-04-23 NOTE — ANESTHESIA PREPROCEDURE EVALUATION
Anesthesia Evaluation     Patient summary reviewed and Nursing notes reviewed   no history of anesthetic complications:   NPO Solid Status: > 8 hours  NPO Liquid Status: > 8 hours           Airway   Mallampati: II  TM distance: >3 FB  Neck ROM: full  No difficulty expected  Dental    (+) poor dentition        Pulmonary    (+) pneumonia , pulmonary embolism,shortness of breath, decreased breath sounds  Cardiovascular     ECG reviewed  PT is on anticoagulation therapy  Patient on routine beta blocker  Rhythm: regular  Rate: normal    (+) hypertension, dysrhythmias Paroxysmal Atrial Fib, HATCH      Neuro/Psych  (+) neuromuscular disease (Hx Guillain Cassville syndrome), numbness  GI/Hepatic/Renal/Endo    (+) obesity, GERD, PUD, GI bleeding     Musculoskeletal     (+) chronic pain, neck pain, neck stiffness  Abdominal    Substance History - negative use     OB/GYN negative ob/gyn ROS         Other   arthritis, blood dyscrasia anemia,     ROS/Med Hx Other: Echo 11/2/2023:  ·  Left Ventricle: The left ventricle is normal size. There is concentric   remodeling. No left ventricular mass or thrombus is seen. The left   ventricular systolic function is normal.  The LVEF as measured by biplane   volume is 63%. The diastolic function is normal.   ·  Right Ventricle: The right ventricle is normal in size. The right   ventricular systolic function is normal.   ·  All cardiac valves were reasonably well visualized with 2D and/or color   flow Doppler and there was no significant valve regurgitation or stenosis   seen.         Phys Exam Other: + beard              Anesthesia Plan    ASA 4     general   total IV anesthesia  intravenous induction     Anesthetic plan, risks, benefits, and alternatives have been provided, discussed and informed consent has been obtained with: patient.  Pre-procedure education provided  Plan discussed with CRNA.      CODE STATUS:

## 2024-04-23 NOTE — ANESTHESIA PROCEDURE NOTES
Airway  Urgency: elective    Date/Time: 4/23/2024 12:36 PM  Airway not difficult    General Information and Staff    Patient location during procedure: OR    Indications and Patient Condition  Indications for airway management: airway protection    Preoxygenated: yes  Mask difficulty assessment: 2 - vent by mask + OA or adjuvant +/- NMBA    Final Airway Details  Final airway type: endotracheal airway      Successful airway: ETT  Cuffed: yes   Successful intubation technique: direct laryngoscopy  Endotracheal tube insertion site: oral  Blade: Rosalba  Blade size: 3  ETT size (mm): 8.5  Cormack-Lehane Classification: grade I - full view of glottis  Placement verified by: chest auscultation, capnometry and palpation of cuff   Measured from: lips  ETT/EBT  to lips (cm): 22  Number of attempts at approach: 1  Assessment: lips, teeth, and gum same as pre-op and atraumatic intubation

## 2024-04-23 NOTE — NURSING NOTE
Patient being discharge to ER due to EKG changes noted intra-op.  No S/S of chest pain, shortness of breath.  Report given to ER. Dr. Pascale Olvera aware and report given to ER doctor, Dr. Chappell.

## 2024-04-23 NOTE — H&P
"      Chief Complaint:  Here for a bronchoscopy for abnormal imaging - ct chest    Ct chest reviewed     Subjective     Interval History: no changes since the last time since was seen in office        Review of Systems:    Review of system system is negative for shortness of breath chest pain lightheadedness dizziness any numbness in any area of the body belly pain nausea vomiting diarrhea shortness of breath cough          Vital Signs  Temp:  [97.5 °F (36.4 °C)] 97.5 °F (36.4 °C)  Heart Rate:  [66-67] 66  Resp:  [18] 18  BP: (126)/(80) 126/80  Body mass index is 26.5 kg/m².  No intake or output data in the 24 hours ending 04/23/24 0856  No intake/output data recorded.    Physical Exam:   General- normal in appearance, not in any acute distress    HEENT- pupils equally reactive to light, normal in size, no scleral icterus    Neck-supple    Chest-respirations normal-on auscultation no wheezing no crackles    S1 and S2 normal    Abdomen-nontender nondistended bowel sounds positive    CNS-nonfocal    Extremities-no edema    Psychiatric-mood good, good eye contact, alert awake oriented     Results Review:     I reviewed the patient's new clinical results.  Results from last 7 days   Lab Units 04/19/24  1347   WBC 10*3/mm3 10.00   HEMOGLOBIN g/dL 15.7   PLATELETS 10*3/mm3 264     Results from last 7 days   Lab Units 04/19/24  1347   SODIUM mmol/L 136   POTASSIUM mmol/L 4.2   CHLORIDE mmol/L 102   CO2 mmol/L 22.1   BUN mg/dL 15   CREATININE mg/dL 0.97   CALCIUM mg/dL 9.1   GLUCOSE mg/dL 93     Lab Results   Component Value Date    INR 0.98 04/19/2024    INR 1.3 (H) 11/03/2023    INR 1.3 (H) 11/02/2023    PROTIME 13.5 04/19/2024    PROTIME 10.5 09/20/2023    PROTIME 11.7 (H) 08/13/2017           Invalid input(s): \"PROT\", \"LABALBU\"      Imaging Results (Last 24 Hours)       ** No results found for the last 24 hours. **                 lactated ringers, 125 mL/hr, Intravenous, Once  sodium chloride, 10 mL, Intravenous, " Q12H           Medication Review:     Assessment & Plan     Abnormal ct chest- all the medications history physical labs are reviewed.  We will do a bronchoscopy with bronchoalveolar lavage his bronchial brushings.  We'll send it for cytology and microbiology.    Informed consent taken.      Abnormal CT of the chest               Nikhil Murillo MD  04/23/24  08:56 EDT

## 2024-04-23 NOTE — ANESTHESIA POSTPROCEDURE EVALUATION
Patient: Rex Castro    Procedure Summary       Date: 04/23/24 Room / Location:  [facility] 10 /  [facility]    Anesthesia Start: 1230 Anesthesia Stop: 1340    Procedure: BRONCHOSCOPY WITH ENDOBRONCHIAL ULTRASOUND AND NAVIGATION (Bronchus) Diagnosis:       Abnormal CT of the chest      (Abnormal CT of the chest [R93.89])    Surgeons: Nikhil Murillo MD Provider: Vishnu Ashraf DO    Anesthesia Type: general ASA Status: 4            Anesthesia Type: general    Vitals  Vitals Value Taken Time   /92 04/23/24 1420   Temp 97 °F (36.1 °C) 04/23/24 1345   Pulse 65 04/23/24 1422   Resp 18 04/23/24 1410   SpO2 94 % 04/23/24 1422   Vitals shown include unfiled device data.        Post Anesthesia Care and Evaluation    Patient location during evaluation: PHASE II  Patient participation: complete - patient participated  Level of consciousness: awake and alert  Pain score: 0  Pain management: adequate    Airway patency: patent  Anesthetic complications: No anesthetic complications  PONV Status: controlled  Cardiovascular status: acceptable  Respiratory status: acceptable and nasal cannula  Hydration status: acceptable    Comments: Mr. Castro developed aflutter during the procedure.  He does have a history of afib/aflutter and is scheduled for an ablation.  There was ST elevation noted during the procedure and a three lead EKG strip was obtained.  After the procedure was over, a 12 lead EKG revealed aflutter with STEMI in the inferior/lateral leads.  Mr. Castro is asymptomatic for CP/dyspnea/etc... His vital signs are stable.  I discussed with Dr. Murillo and the ED physician.  We will transfer Mr. Castro to the ED for definitive care and rule out STEMI.  I have spoken with Mr. Castro and he understands the need for further evaluation.

## 2024-04-24 LAB
ACID FAST STN SPEC: NEGATIVE
REF LAB TEST METHOD: NORMAL
SPECIMEN PREPARATION: NORMAL

## 2024-04-24 RX ORDER — MIDAZOLAM HYDROCHLORIDE 1 MG/ML
INJECTION INTRAMUSCULAR; INTRAVENOUS AS NEEDED
Status: DISCONTINUED | OUTPATIENT
Start: 2024-04-23 | End: 2024-04-24 | Stop reason: SURG

## 2024-04-24 NOTE — ED NOTES
"Dr. Tai and I were at bedside. Dr. aTi was discussing the benefits of the pt staying overnight to be monitored. Pt stated that he had already been dealing with \"this\" for seven months and wanted to go home at this time. Pt and family were educated about risk of leaving AMA, and verbalized understanding at this time.  "

## 2024-04-24 NOTE — ED NOTES
"Upon rounding pt noted to be upset and tired of waiting. Pt informed that being in the ED is a process and it takes time. Pt states, \"I am ready to go home.\" I informed pt that the plan is to admit him as he is still in atrial flutter.I asked pt if he is willing to stay and pt states, \"I want to go home and if I die then I die.\" Pt educated on the risk of leaving and Dr. Tai is aware and will speak with pt.  "

## 2024-04-24 NOTE — DISCHARGE INSTRUCTIONS
Home in care of family.  Rest.  Resume your Eliquis tomorrow morning as instructed by Dr. Murillo.  See Dr. Mi at his first available appointment this week, call early tomorrow morning to schedule.  Return to the emergency department immediately if symptoms worsen or any problems.  You are leaving AGAINST MEDICAL ADVICE.   Otezla Pregnancy And Lactation Text: This medication is Pregnancy Category C and it isn't known if it is safe during pregnancy. It is unknown if it is excreted in breast milk.

## 2024-04-25 ENCOUNTER — TELEPHONE (OUTPATIENT)
Dept: PULMONOLOGY | Facility: CLINIC | Age: 64
End: 2024-04-25
Payer: MEDICARE

## 2024-04-25 DIAGNOSIS — R91.1 PULMONARY NODULE: ICD-10-CM

## 2024-04-25 DIAGNOSIS — R93.89 ABNORMAL CT OF THE CHEST: Primary | ICD-10-CM

## 2024-04-25 LAB
BACTERIA SPEC AEROBE CULT: NO GROWTH
GRAM STN SPEC: NORMAL
GRAM STN SPEC: NORMAL

## 2024-04-25 NOTE — TELEPHONE ENCOUNTER
----- Message from Nikhil Murillo MD sent at 4/25/2024 12:15 PM EDT -----  The bronch came back negative for cancer,  Will get PT ct chest.    Please let the patient know.    Ty  ss  ----- Message -----  From: Lab, Background User  Sent: 4/24/2024  10:12 AM EDT  To: Nikhil Murillo MD

## 2024-04-26 LAB
FUNGUS SPEC CULT: NORMAL
FUNGUS SPEC FUNGUS STN: NORMAL

## 2024-05-02 ENCOUNTER — HOSPITAL ENCOUNTER (OUTPATIENT)
Dept: PET IMAGING | Facility: HOSPITAL | Age: 64
Discharge: HOME | End: 2024-05-02
Payer: MEDICARE

## 2024-05-02 DIAGNOSIS — R91.1 PULMONARY NODULE: ICD-10-CM

## 2024-05-02 DIAGNOSIS — R93.89 ABNORMAL CT OF THE CHEST: ICD-10-CM

## 2024-05-02 PROCEDURE — 0 FLUDEOXYGLUCOSE F18 SOLUTION: Performed by: INTERNAL MEDICINE

## 2024-05-02 PROCEDURE — 78815 PET IMAGE W/CT SKULL-THIGH: CPT | Performed by: RADIOLOGY

## 2024-05-02 PROCEDURE — 78815 PET IMAGE W/CT SKULL-THIGH: CPT

## 2024-05-02 PROCEDURE — A9552 F18 FDG: HCPCS | Performed by: INTERNAL MEDICINE

## 2024-05-02 RX ADMIN — FLUDEOXYGLUCOSE F 18 1 DOSE: 200 INJECTION, SOLUTION INTRAVENOUS at 08:24

## 2024-05-22 LAB
FUNGUS SPEC CULT: NORMAL
FUNGUS SPEC FUNGUS STN: NORMAL

## 2024-05-24 NOTE — ED PROVIDER NOTES
Subjective   History of Present Illness  63 YO WM presents w/ arrhythmia.  Pt sent from outpt surg for atrial flutter. PT was to have bronchoscopy, but found to be in a flutter.  Asyx w/o palp, CP, SOB, dizziness, lightheadedness, weakness or other syxs. No know h/o arrhythmia.      Review of Systems   All other systems reviewed and are negative.      Past Medical History:   Diagnosis Date    Atrial arrhythmia     GERD (gastroesophageal reflux disease)     Hemorrhoids     History of transfusion     Hypertension     Inguinal hernia     Left Inguinal hernia     Psoriasis        No Known Allergies    Past Surgical History:   Procedure Laterality Date    BRONCHOSCOPY N/A 4/23/2024    Procedure: BRONCHOSCOPY WITH ENDOBRONCHIAL ULTRASOUND AND NAVIGATION WITH ION ROBOT;  Surgeon: Nikhil Murillo MD;  Location: [facility] OR;  Service: Pulmonary;  Laterality: N/A;    CARDIAC CATHETERIZATION N/A 08/10/2017    Procedure: Left Heart Cath;  Surgeon: Adonis Sparks MD;  Location:  [facility];  Service:     ENDOSCOPY N/A 08/13/2017    Procedure: ESOPHAGOGASTRODUODENOSCOPY;  Surgeon: Mark I Brunner, MD;  Location:  [facility];  Service:     EXPLORATORY LAPAROTOMY      ulcer repair    INGUINAL HERNIA REPAIR Left        Family History   Problem Relation Age of Onset    Cancer Mother     Heart attack Mother     Prostate cancer Father     GI problems Brother     Rectal cancer Other        Social History     Socioeconomic History    Marital status:    Tobacco Use    Smoking status: Some Days     Types: Cigars     Start date: 8/10/2016    Tobacco comments:     smokes cigar every once in awhile   Vaping Use    Vaping status: Some Days    Substances: Nicotine, Flavoring    Devices: Disposable   Substance and Sexual Activity    Alcohol use: No    Drug use: Yes     Types: Marijuana     Comment: Use to smoke marijuana x 40yrs    Sexual activity: Defer     Partners: Female           Objective   Physical  Exam  Vitals and nursing note reviewed. Exam conducted with a chaperone present.   Constitutional:       Appearance: He is normal weight.   HENT:      Head: Normocephalic and atraumatic.   Cardiovascular:      Rate and Rhythm: Normal rate and regular rhythm.      Heart sounds: No murmur heard.     No friction rub. No gallop.   Pulmonary:      Effort: Pulmonary effort is normal. No respiratory distress.      Breath sounds: Normal breath sounds. No wheezing, rhonchi or rales.   Chest:      Chest wall: No tenderness.   Abdominal:      General: Abdomen is flat. Bowel sounds are normal. There is no distension.      Palpations: Abdomen is soft.      Tenderness: There is no abdominal tenderness.   Musculoskeletal:      Right lower leg: No edema.      Left lower leg: No edema.   Skin:     General: Skin is warm and dry.   Neurological:      General: No focal deficit present.      Mental Status: He is alert and oriented to person, place, and time.   Psychiatric:         Mood and Affect: Mood normal.         Behavior: Behavior normal.       Results for orders placed or performed during the hospital encounter of 04/23/24   Comprehensive Metabolic Panel    Specimen: Arm, Right; Blood   Result Value Ref Range    Glucose 99 65 - 99 mg/dL    BUN 19 8 - 23 mg/dL    Creatinine 1.10 0.76 - 1.27 mg/dL    Sodium 139 136 - 145 mmol/L    Potassium 4.2 3.5 - 5.2 mmol/L    Chloride 106 98 - 107 mmol/L    CO2 20.9 (L) 22.0 - 29.0 mmol/L    Calcium 8.9 8.6 - 10.5 mg/dL    Total Protein 6.5 6.0 - 8.5 g/dL    Albumin 3.8 3.5 - 5.2 g/dL    ALT (SGPT) 12 1 - 41 U/L    AST (SGOT) 12 1 - 40 U/L    Alkaline Phosphatase 87 39 - 117 U/L    Total Bilirubin 0.2 0.0 - 1.2 mg/dL    Globulin 2.7 gm/dL    A/G Ratio 1.4 g/dL    BUN/Creatinine Ratio 17.3 7.0 - 25.0    Anion Gap 12.1 5.0 - 15.0 mmol/L    eGFR 74.0 >60.0 mL/min/1.73   Single High Sensitivity Troponin T    Specimen: Arm, Right; Blood   Result Value Ref Range    HS Troponin T 21 <22 ng/L   CBC  Auto Differential    Specimen: Arm, Right; Blood   Result Value Ref Range    WBC 11.02 (H) 3.40 - 10.80 10*3/mm3    RBC 4.42 4.14 - 5.80 10*6/mm3    Hemoglobin 14.4 13.0 - 17.7 g/dL    Hematocrit 43.6 37.5 - 51.0 %    MCV 98.6 (H) 79.0 - 97.0 fL    MCH 32.6 26.6 - 33.0 pg    MCHC 33.0 31.5 - 35.7 g/dL    RDW 14.5 12.3 - 15.4 %    RDW-SD 52.4 37.0 - 54.0 fl    MPV 9.2 6.0 - 12.0 fL    Platelets 235 140 - 450 10*3/mm3    Neutrophil % 65.1 42.7 - 76.0 %    Lymphocyte % 17.9 (L) 19.6 - 45.3 %    Monocyte % 8.0 5.0 - 12.0 %    Eosinophil % 8.1 (H) 0.3 - 6.2 %    Basophil % 0.5 0.0 - 1.5 %    Immature Grans % 0.4 0.0 - 0.5 %    Neutrophils, Absolute 7.18 (H) 1.70 - 7.00 10*3/mm3    Lymphocytes, Absolute 1.97 0.70 - 3.10 10*3/mm3    Monocytes, Absolute 0.88 0.10 - 0.90 10*3/mm3    Eosinophils, Absolute 0.89 (H) 0.00 - 0.40 10*3/mm3    Basophils, Absolute 0.06 0.00 - 0.20 10*3/mm3    Immature Grans, Absolute 0.04 0.00 - 0.05 10*3/mm3    nRBC 0.0 0.0 - 0.2 /100 WBC   Single High Sensitivity Troponin T    Specimen: Blood   Result Value Ref Range    HS Troponin T 22 (H) <22 ng/L   ECG 12 Lead Rhythm Change   Result Value Ref Range    QT Interval 418 ms    QTC Interval 365 ms     NM PET/CT Skull Base to Mid Thigh    Result Date: 5/2/2024  Narrative: PROCEDURE: NM PET/CT SKULL BASE TO MID THIGH-  HISTORY: Lung nodule, > 8mm; R93.89-Abnormal findings on diagnostic imaging of other specified body structures; R91.1-Solitary pulmonary nodule  COMPARISON: CT of the chest dated 3/27/2024 and CT of the abdomen and pelvis dated 8/12/2017.  TECHNIQUE: Patient was injected with 11.2 mCi of 18-F FDG. Whole-body PET was performed of the neck, chest, abdomen and pelvis. Multiplanar reconstruction and fused images were reviewed.  Blood glucose at time of injection was 89 mg/dL.  FINDINGS: There is a physiologic distribution of tracer within the neck. There is a peripheral opacity in the right middle lobe measuring approximately 1.6 x 1.5 cm  which demonstrates low-grade FDG uptake with an mSUV of 1.2. No hypermetabolic lesions are seen within the chest. There is no mediastinal adenopathy. There is a physiologic distribution of tracer within the abdomen and pelvis. Bone windows reveal no lytic or destructive lesions or hypermetabolic destructive processes.      Impression: Physiologic distribution of tracer with no evidence of metastatic disease. Specifically, the peripheral opacity in the right middle lobe is not distinctly hypermetabolic. Continued follow-up with a noncontrast chest CT in 3 months is recommended.      This report was finalized on 5/2/2024 10:46 AM by Quintin Vergara M.D..         Procedures           ED Course  ED Course as of 05/24/24 1240   Tue Apr 23, 2024   1743 ECG 12 Lead Rhythm Change  Atrial flutter with variable A-V block.  Rate 46.  Normal axis.  Normal QT.  Q wave in lead II, 3, aVF.  Voltage criteria for LVH.  Early repolarization changes.  No acute ischemic changes.  Abnormal EKG.  Interpreted by me.  Electronically signed by Raf Mckeon MD, 04/23/24, 5:45 PM EDT.   [BC]   2055 I assumed patient's care from Dr. Mckeon this evening shift change.  Please see his documentation above.  I have discussed the case with Dr. Crenshaw, and I recommended to the patient that he be admitted to the hospital.  However, he refuses to be admitted, he states that his heart rate has been doing this for a long time, his daughter concurs that this has been going on for months and is unchanged from usual.  Patient states she has no symptoms from, and he does not wish to stay in the hospital.  He is signing out AMA.  He does have a family member that we will be staying with him tonight.  He will follow-up closely with his cardiologist Dr. Mi.  He will return to the emergency department immediately if any problems.  Electronically signed by Homero Tai MD, 04/23/24, 10:20 PM EDT.   [CM]      ED Course User Index  [BC] Raf Mckeon,  MD  [CM] Homero Tai MD                                             Medical Decision Making  Problems Addressed:  Atrial fibrillation with slow ventricular response: complicated acute illness or injury    Amount and/or Complexity of Data Reviewed  Labs: ordered.  Radiology: ordered.  ECG/medicine tests: ordered. Decision-making details documented in ED Course.        Final diagnoses:   Atrial fibrillation with slow ventricular response       ED Disposition  ED Disposition       ED Disposition   AMA    Condition   --    Comment   --               Sabrina Mi MD  [address] [facility] [address]  [phone]    Go to   Call in the morning for first available appointment    [facility] EMERGENCY DEPARTMENT  [address] [facility] [id number] [phone]  Go to   If symptoms worsen         Medication List      No changes were made to your prescriptions during this visit.            Raf Mckeon MD  05/24/24 0861

## 2024-06-08 LAB
ACID FAST STN SPEC: NEGATIVE
MYCOBACTERIUM SPEC QL CULT: NEGATIVE
SPECIMEN PREPARATION: NORMAL

## (undated) DEVICE — SINGLE USE SUCTION VALVE MAJ-209: Brand: SINGLE USE SUCTION VALVE (STERILE)

## (undated) DEVICE — PAD HEMOST NEPTUNE 2X2IN

## (undated) DEVICE — BRUSH CYTO MULTI APPL

## (undated) DEVICE — "MH-443 SUCTION VALVE F/EVIS140 EVIS160": Brand: SUCTION VALVE

## (undated) DEVICE — INTRO ACCSR BLNT TP

## (undated) DEVICE — CATH F5 INF JL 4 100CM: Brand: INFINITI

## (undated) DEVICE — "MH-438 A/W VLVE F/140 EVIS-140": Brand: AIR/WATER VALVE

## (undated) DEVICE — CATH F5 INF JR 4 100CM: Brand: INFINITI

## (undated) DEVICE — SWIVEL CONNECTOR

## (undated) DEVICE — ADAPT SWVL FIBROPTIC BRONCH

## (undated) DEVICE — LN INJ CONTRST FLXCIL HP F/M LL 1200PSI10

## (undated) DEVICE — SHEATH INTRO SUPERSHEATH JWIRE .035 5F 11CM

## (undated) DEVICE — TRAP SPECI MUCUS 40CC LF STRL

## (undated) DEVICE — BIOPSY NEEDLE, 19G: Brand: FLEXISION

## (undated) DEVICE — Device: Brand: ENDOGATOR

## (undated) DEVICE — ST EXT IV SMARTSITE 2VLV SP M LL 5ML IV1

## (undated) DEVICE — EP LEFT SUBCLAVIAN SHIELD-YELLOW: Brand: RADPAD

## (undated) DEVICE — SYR SLPTP 20CC

## (undated) DEVICE — CANN NASL CO2 DIVIDED A/

## (undated) DEVICE — GOWN,REINF,POLY,ECL,PP SLV,XL: Brand: MEDLINE

## (undated) DEVICE — DEV INFL MONARCH 25W

## (undated) DEVICE — PK CATH CARD 70

## (undated) DEVICE — VLV SXN BRONCH DISP FOR FLEX ENDOSCOPE

## (undated) DEVICE — THE BITE BLOCK MAXI, LATEX FREE STRAP IS USED TO PROTECT THE ENDOSCOPE INSERTION TUBE FROM BEING BITTEN BY THE PATIENT.

## (undated) DEVICE — "MH-948 A/W CHANNEL CLEANING ADPTR -VIDEO": Brand: AW CHANNEL CLEANING ADAPTE

## (undated) DEVICE — ADULT, RADIOTRANSPARENT ELEMENT, COMPATIBLE W/ ZOLL: Brand: DEFIBRILLATION ELECTRODES

## (undated) DEVICE — SOL LR 1000ML

## (undated) DEVICE — SINGLE USE BIOPSY VALVE MAJ-210: Brand: SINGLE USE BIOPSY VALVE (STERILE)

## (undated) DEVICE — SUCTION CANISTER, 1500CC, RIGID: Brand: DEROYAL

## (undated) DEVICE — ENDOGATOR HYBRID TUBING KIT FOR USE WITH ENDOGATOR IRRIGATION PUMP, OLYMPUS PUMP, GI4000 ESU, AND TORRENT IRRIGATION PUMP.: Brand: ENDOGATOR KIT

## (undated) DEVICE — CATHETER GUIDE

## (undated) DEVICE — GW INQW FIX/CORE PTFE J/3MM .035 260CM

## (undated) DEVICE — BOWL UTIL STRL 32OZ

## (undated) DEVICE — HOLDER: Brand: DEROYAL

## (undated) DEVICE — FRCP BX RADJAW4 PULM WO NDL STD1.8X2 100

## (undated) DEVICE — TUBING, SUCTION, 1/4" X 20', STRAIGHT: Brand: MEDLINE INDUSTRIES, INC.

## (undated) DEVICE — CONTN GRAD MEAS TRIANG 32OZ BLK

## (undated) DEVICE — ADULT DISPOSABLE SINGLE-PATIENT USE PULSE OXIMETER SENSOR: Brand: NONIN

## (undated) DEVICE — SYR LUER SLPTP 50ML

## (undated) DEVICE — Device

## (undated) DEVICE — SYR LUERLOK 50ML

## (undated) DEVICE — Device: Brand: ION

## (undated) DEVICE — CANNULA,OXY,ADULT,SUPER SOFT,W/14'TUB,UC: Brand: MEDLINE INDUSTRIES, INC.

## (undated) DEVICE — ST NDL BRONCH ASP VIZISHOT 2 FLX 19GA

## (undated) DEVICE — TBG 02 CRUSH RESIST LF CLR 7FT

## (undated) DEVICE — VISION PROBE ADAPTER AND SUCTION ADAPTER

## (undated) DEVICE — MEDI-VAC YANKAUER SUCTION HANDLE W/BULBOUS TIP: Brand: CARDINAL HEALTH

## (undated) DEVICE — Device: Brand: MEDEX

## (undated) DEVICE — SINGLE-USE BIOPSY FORCEPS: Brand: RADIAL JAW 4

## (undated) DEVICE — VISION PROBE: Brand: ION

## (undated) DEVICE — SHEATH INTRO SUPERSHEATH JWIRE .035 6F 11CM

## (undated) DEVICE — SENSR O2 OXIMAX FNGR A/ 18IN NONSTR

## (undated) DEVICE — HI-TORQUE BALANCE MIDDLEWEIGHT GUIDE WIRE W/HYDROCOAT .014 STRAIGHT TIP 3.0 CM X 190 CM: Brand: HI-TORQUE BALANCE MIDDLEWEIGHT

## (undated) DEVICE — ST INF PRI SMRTSTE 20DRP 2VLV 24ML 117

## (undated) DEVICE — SOL IRR NACL 0.9PCT 1000ML